# Patient Record
Sex: MALE | Race: WHITE | NOT HISPANIC OR LATINO
[De-identification: names, ages, dates, MRNs, and addresses within clinical notes are randomized per-mention and may not be internally consistent; named-entity substitution may affect disease eponyms.]

---

## 2020-12-31 DIAGNOSIS — Z82.49 FAMILY HISTORY OF ISCHEMIC HEART DISEASE AND OTHER DISEASES OF THE CIRCULATORY SYSTEM: ICD-10-CM

## 2020-12-31 DIAGNOSIS — Z87.891 PERSONAL HISTORY OF NICOTINE DEPENDENCE: ICD-10-CM

## 2020-12-31 PROBLEM — Z00.00 ENCOUNTER FOR PREVENTIVE HEALTH EXAMINATION: Status: ACTIVE | Noted: 2020-12-31

## 2020-12-31 RX ORDER — DAPAGLIFLOZIN 10 MG/1
10 TABLET, FILM COATED ORAL DAILY
Refills: 0 | Status: ACTIVE | COMMUNITY

## 2020-12-31 RX ORDER — SITAGLIPTIN AND METFORMIN HYDROCHLORIDE 50; 500 MG/1; MG/1
50-500 TABLET, FILM COATED ORAL TWICE DAILY
Refills: 0 | Status: ACTIVE | COMMUNITY

## 2020-12-31 RX ORDER — TAMSULOSIN HYDROCHLORIDE 0.4 MG/1
0.4 CAPSULE ORAL DAILY
Refills: 0 | Status: ACTIVE | COMMUNITY

## 2020-12-31 RX ORDER — DEXLANSOPRAZOLE 60 MG/1
60 CAPSULE, DELAYED RELEASE ORAL EVERY MORNING
Refills: 0 | Status: ACTIVE | COMMUNITY

## 2020-12-31 RX ORDER — ACETAMINOPHEN 325 MG/1
TABLET, FILM COATED ORAL
Refills: 0 | Status: ACTIVE | COMMUNITY

## 2020-12-31 RX ORDER — DULAGLUTIDE 1.5 MG/.5ML
1.5 INJECTION, SOLUTION SUBCUTANEOUS WEEKLY
Refills: 0 | Status: ACTIVE | COMMUNITY

## 2021-01-04 ENCOUNTER — APPOINTMENT (OUTPATIENT)
Dept: CARDIOLOGY | Facility: CLINIC | Age: 76
End: 2021-01-04
Payer: MEDICARE

## 2021-01-04 ENCOUNTER — NON-APPOINTMENT (OUTPATIENT)
Age: 76
End: 2021-01-04

## 2021-01-04 VITALS — SYSTOLIC BLOOD PRESSURE: 160 MMHG | DIASTOLIC BLOOD PRESSURE: 100 MMHG

## 2021-01-04 VITALS
BODY MASS INDEX: 34.86 KG/M2 | HEART RATE: 73 BPM | HEIGHT: 68 IN | DIASTOLIC BLOOD PRESSURE: 105 MMHG | OXYGEN SATURATION: 98 % | RESPIRATION RATE: 16 BRPM | TEMPERATURE: 96 F | WEIGHT: 230 LBS | SYSTOLIC BLOOD PRESSURE: 150 MMHG

## 2021-01-04 PROCEDURE — 93000 ELECTROCARDIOGRAM COMPLETE: CPT

## 2021-01-04 PROCEDURE — 99204 OFFICE O/P NEW MOD 45 MIN: CPT

## 2021-01-04 RX ORDER — VALSARTAN 160 MG/1
160 TABLET, COATED ORAL TWICE DAILY
Refills: 0 | Status: DISCONTINUED | COMMUNITY
End: 2021-01-04

## 2021-01-04 RX ORDER — ATENOLOL 25 MG/1
25 TABLET ORAL DAILY
Refills: 0 | Status: DISCONTINUED | COMMUNITY
End: 2021-01-04

## 2021-01-04 NOTE — REVIEW OF SYSTEMS
[Recent Weight Gain (___ Lbs)] : recent [unfilled] ~Ulb weight gain [Dyspnea on exertion] : dyspnea during exertion [Chest  Pressure] : chest pressure [Chest Pain] : chest pain [Negative] : Heme/Lymph [Lower Ext Edema] : no extremity edema [Palpitations] : no palpitations

## 2021-01-04 NOTE — HISTORY OF PRESENT ILLNESS
[FreeTextEntry1] : 76 yo man presents for evaluation of shortness of breath / chest pain. He presents with his son, Harris [phone]).\par \par H/o DM (on oral medications; no known complications of DM), HLD, HTN (long-standing), obesity; former smoker, stopped many years ago.\par \par No h/o heart disease, MI, heart failure, PVD, stroke. No bleeding issues. He had COVID in March, was ill, but not hospitalized. He did not require oxygen. He has gained weight since that time, and continues to feel short of breath with exertion since COVID. No SOB at rest. No PND, orthopnea. No LE edema. He states he had a recent CXR at PCP office, and was told was clear. \par \par He is compliant with his medications. He checks his BP at home, and the numbers remain elevated, consistently. He has noted this hypertension now for the past few months. For years, he has noted intermittent left sided chest pain, which he thinks gets worse with his BP elevation. This chest discomfort seems to be constant, lasting days, and seems to improve when his BP gets better. States he had a stress test a few years ago, which reportedly was negative. Says he takes a Russian medication under his tongue, which sometimes relieves his discomfort. He is unclear as to the name of this medication. \par \par Today's ECG\par Sinus rhythm \par First degree A-V block \par Left axis deviation\par Late transition\par Intraventricular conduction delay

## 2021-01-04 NOTE — PHYSICAL EXAM
[General Appearance - Well Developed] : well developed [Normal Appearance] : normal appearance [Well Groomed] : well groomed [General Appearance - Well Nourished] : well nourished [General Appearance - In No Acute Distress] : no acute distress [FreeTextEntry1] : no JVD, carotids 2+, no bruits [Heart Rate And Rhythm] : heart rate and rhythm were normal [Heart Sounds] : normal S1 and S2 [Murmurs] : no murmurs present [Arterial Pulses Normal] : the arterial pulses were normal [Edema] : no peripheral edema present [] : no respiratory distress [Auscultation Breath Sounds / Voice Sounds] : lungs were clear to auscultation bilaterally [Bowel Sounds] : normal bowel sounds [Abdomen Soft] : soft

## 2021-01-05 LAB
ALBUMIN SERPL ELPH-MCNC: 4.5 G/DL
ALP BLD-CCNC: 42 U/L
ALT SERPL-CCNC: 16 U/L
ANION GAP SERPL CALC-SCNC: 16 MMOL/L
AST SERPL-CCNC: 16 U/L
BASOPHILS # BLD AUTO: 0.04 K/UL
BASOPHILS NFR BLD AUTO: 0.5 %
BILIRUB SERPL-MCNC: 0.3 MG/DL
BUN SERPL-MCNC: 14 MG/DL
CALCIUM SERPL-MCNC: 9.7 MG/DL
CHLORIDE SERPL-SCNC: 98 MMOL/L
CHOLEST SERPL-MCNC: 247 MG/DL
CO2 SERPL-SCNC: 24 MMOL/L
CREAT SERPL-MCNC: 0.92 MG/DL
EOSINOPHIL # BLD AUTO: 0.26 K/UL
EOSINOPHIL NFR BLD AUTO: 2.9 %
ESTIMATED AVERAGE GLUCOSE: 146 MG/DL
GLUCOSE SERPL-MCNC: 68 MG/DL
HBA1C MFR BLD HPLC: 6.7 %
HCT VFR BLD CALC: 49.5 %
HDLC SERPL-MCNC: 62 MG/DL
HGB BLD-MCNC: 15.4 G/DL
IMM GRANULOCYTES NFR BLD AUTO: 0.6 %
LDLC SERPL CALC-MCNC: 147 MG/DL
LYMPHOCYTES # BLD AUTO: 2.72 K/UL
LYMPHOCYTES NFR BLD AUTO: 30.8 %
MAN DIFF?: NORMAL
MCHC RBC-ENTMCNC: 26.6 PG
MCHC RBC-ENTMCNC: 31.1 GM/DL
MCV RBC AUTO: 85.6 FL
MONOCYTES # BLD AUTO: 0.91 K/UL
MONOCYTES NFR BLD AUTO: 10.3 %
NEUTROPHILS # BLD AUTO: 4.86 K/UL
NEUTROPHILS NFR BLD AUTO: 54.9 %
NONHDLC SERPL-MCNC: 184 MG/DL
PLATELET # BLD AUTO: 292 K/UL
POTASSIUM SERPL-SCNC: 5.2 MMOL/L
PROT SERPL-MCNC: 7.3 G/DL
RBC # BLD: 5.78 M/UL
RBC # FLD: 12.9 %
SODIUM SERPL-SCNC: 139 MMOL/L
TRIGL SERPL-MCNC: 185 MG/DL
TSH SERPL-ACNC: 4.75 UIU/ML
WBC # FLD AUTO: 8.84 K/UL

## 2021-01-07 ENCOUNTER — APPOINTMENT (OUTPATIENT)
Dept: CV DIAGNOSTICS | Facility: HOSPITAL | Age: 76
End: 2021-01-07
Payer: MEDICARE

## 2021-01-07 ENCOUNTER — OUTPATIENT (OUTPATIENT)
Dept: OUTPATIENT SERVICES | Facility: HOSPITAL | Age: 76
LOS: 1 days | End: 2021-01-07

## 2021-01-07 DIAGNOSIS — I10 ESSENTIAL (PRIMARY) HYPERTENSION: ICD-10-CM

## 2021-01-07 PROCEDURE — 78452 HT MUSCLE IMAGE SPECT MULT: CPT | Mod: 26

## 2021-01-07 PROCEDURE — 93016 CV STRESS TEST SUPVJ ONLY: CPT | Mod: GC

## 2021-01-07 PROCEDURE — 93018 CV STRESS TEST I&R ONLY: CPT | Mod: GC

## 2021-01-11 ENCOUNTER — OUTPATIENT (OUTPATIENT)
Dept: OUTPATIENT SERVICES | Facility: HOSPITAL | Age: 76
LOS: 1 days | End: 2021-01-11

## 2021-01-11 ENCOUNTER — APPOINTMENT (OUTPATIENT)
Dept: CV DIAGNOSITCS | Facility: HOSPITAL | Age: 76
End: 2021-01-11
Payer: MEDICARE

## 2021-01-11 DIAGNOSIS — I10 ESSENTIAL (PRIMARY) HYPERTENSION: ICD-10-CM

## 2021-01-11 PROCEDURE — 93306 TTE W/DOPPLER COMPLETE: CPT | Mod: 26

## 2021-01-25 ENCOUNTER — APPOINTMENT (OUTPATIENT)
Dept: CARDIOLOGY | Facility: CLINIC | Age: 76
End: 2021-01-25
Payer: MEDICARE

## 2021-01-25 VITALS — HEART RATE: 80 BPM | RESPIRATION RATE: 18 BRPM | DIASTOLIC BLOOD PRESSURE: 88 MMHG | SYSTOLIC BLOOD PRESSURE: 125 MMHG

## 2021-01-25 PROCEDURE — 99214 OFFICE O/P EST MOD 30 MIN: CPT

## 2021-01-25 NOTE — HISTORY OF PRESENT ILLNESS
[FreeTextEntry1] : 76 yo man presents for followup of shortness of breath / chest pain. He presents with his son, Harris (tel: [phone]).\par \par H/o DM (on oral medications; no known complications of DM), HLD, HTN (long-standing), obesity; former smoker, stopped many years ago.\par \par No h/o known heart disease, MI, heart failure, PVD, stroke. No bleeding issues. He had COVID in March, was ill, but not hospitalized. He did not require oxygen. He has gained weight since that time, and continues to feel short of breath with exertion since COVID. No SOB at rest. No PND, orthopnea. No LE edema. He states he had a recent CXR at PCP office, and was told was clear. He is compliant with his medications. He checks his BP at home.\par \par At his last visit, the following issues were discussed:\par Diabetes (250.00) (E11.9)\par  · Continue with med rx and diet to address his DM. He understands the need for\par     improvement in his diet with an eye towards weight loss.\par Hypertension (401.9) (I10)\par  · Persistent, poorly controlled hypertension. Will adjust medications today and follow\par     closely. Today, will change atenolol to carvedilol and valsartan to losartan/HCTZ.\par Shortness of breath (786.05) (R06.02)\par  · Chronic CAMACHO, perhaps due to COVID, obesity, heart disease.\par Hyperlipemia (272.4) (E78.5)\par  · Continue statin therapy.\par Chest pain (786.50) (R07.9)\par  · Long-standning, near constant, atypical-sounding chest discomfort. However, he is\par     clearly at risk for CAD. Will pursue echo and a stress\par     test, as well as labs.\par \par Recent data:\par Echo: minimal MR, mild aortic root dilation, calcified aortic valve with normal opening, normal LA, grossly normal LVEF, no peric effusion\par Stress test: Regadenoson; no chest pain or EKG changes; scan shows a small, mild defect in the mid to distal inferolateral wall (fixed) c/w infarct; LVEF 60% with no segmental wall motion abnormality\par CXR ([facility]) in Oct 2020: no pneumonia; bibasilar atelectasis/scarring; small hiatal hernia\par Labs: CBC normal\par A1c 6.7%\par TSH 4.75\par chol 247, trig 185, HDL 62,  (Crestor dose was increased to 20 mg)\par Na 139, K 5.2, BUN 14, creat 0.92, LFT's normal\par \par Today, he presents with his son (on the phone). BP has been relatively stable at home. He has been compliant with all of the medications. Breathing seems a bit better (in general), including with exertion. Rare, fleeting under left breast discomfort, better with message. Overall, he is pleased with his progress.

## 2021-01-25 NOTE — REVIEW OF SYSTEMS
[Recent Weight Gain (___ Lbs)] : recent [unfilled] ~Ulb weight gain [Dyspnea on exertion] : dyspnea during exertion [Chest Pain] : chest pain [Negative] : Heme/Lymph [Chest  Pressure] : no chest pressure [Lower Ext Edema] : no extremity edema [Palpitations] : no palpitations

## 2021-01-25 NOTE — PHYSICAL EXAM
[General Appearance - Well Developed] : well developed [Normal Appearance] : normal appearance [Well Groomed] : well groomed [General Appearance - Well Nourished] : well nourished [General Appearance - In No Acute Distress] : no acute distress [Auscultation Breath Sounds / Voice Sounds] : lungs were clear to auscultation bilaterally [] : no respiratory distress [Heart Rate And Rhythm] : heart rate and rhythm were normal [Heart Sounds] : normal S1 and S2 [Murmurs] : no murmurs present [Arterial Pulses Normal] : the arterial pulses were normal [Edema] : no peripheral edema present [Bowel Sounds] : normal bowel sounds [Abdomen Soft] : soft [FreeTextEntry1] : no JVD, carotids 2+, no bruits

## 2021-01-26 LAB
ANION GAP SERPL CALC-SCNC: 26 MMOL/L
BUN SERPL-MCNC: 16 MG/DL
CALCIUM SERPL-MCNC: 9.9 MG/DL
CHLORIDE SERPL-SCNC: 98 MMOL/L
CO2 SERPL-SCNC: 13 MMOL/L
CREAT SERPL-MCNC: 0.88 MG/DL
GLUCOSE SERPL-MCNC: 96 MG/DL
POTASSIUM SERPL-SCNC: 5.1 MMOL/L
SODIUM SERPL-SCNC: 138 MMOL/L

## 2021-05-03 ENCOUNTER — NON-APPOINTMENT (OUTPATIENT)
Age: 76
End: 2021-05-03

## 2021-05-03 ENCOUNTER — APPOINTMENT (OUTPATIENT)
Dept: CARDIOLOGY | Facility: CLINIC | Age: 76
End: 2021-05-03
Payer: MEDICARE

## 2021-05-03 VITALS
HEART RATE: 68 BPM | TEMPERATURE: 96.9 F | OXYGEN SATURATION: 98 % | BODY MASS INDEX: 35.92 KG/M2 | DIASTOLIC BLOOD PRESSURE: 100 MMHG | WEIGHT: 237 LBS | HEIGHT: 68 IN | SYSTOLIC BLOOD PRESSURE: 162 MMHG

## 2021-05-03 PROCEDURE — 99214 OFFICE O/P EST MOD 30 MIN: CPT

## 2021-05-03 PROCEDURE — 93000 ELECTROCARDIOGRAM COMPLETE: CPT

## 2021-05-03 NOTE — PHYSICAL EXAM
[Well Developed] : well developed [Well Nourished] : well nourished [No Acute Distress] : no acute distress [Obese] : obese [Normal Conjunctiva] : normal conjunctiva [Normal Venous Pressure] : normal venous pressure [No Carotid Bruit] : no carotid bruit [Normal S1, S2] : normal S1, S2 [No Murmur] : no murmur [No Rub] : no rub [No Gallop] : no gallop [Clear Lung Fields] : clear lung fields [Good Air Entry] : good air entry [No Respiratory Distress] : no respiratory distress  [Soft] : abdomen soft [Non Tender] : non-tender [No Masses/organomegaly] : no masses/organomegaly [Normal Bowel Sounds] : normal bowel sounds [Normal Gait] : normal gait [No Cyanosis] : no cyanosis [No Clubbing] : no clubbing [No Varicosities] : no varicosities [No Rash] : no rash [No Skin Lesions] : no skin lesions [Moves all extremities] : moves all extremities [No Focal Deficits] : no focal deficits [Normal Speech] : normal speech [Alert and Oriented] : alert and oriented [Normal memory] : normal memory [de-identified] : Mild pitting edema, ankles

## 2021-05-03 NOTE — REASON FOR VISIT
[FreeTextEntry1] : 77 yo man presents for followup of shortness of breath / chest pain. He presents with his son, Harris (tel: [phone]).\par \par H/o DM (on oral medications; no known complications of DM), HLD, HTN (long-standing), obesity; former smoker, stopped many years ago.\par \par No h/o known heart disease, MI, heart failure, PVD, stroke. No bleeding issues. He had COVID in March, was ill, but not hospitalized. He did not require oxygen. He has gained weight since that time, and continues to feel short of breath with exertion since COVID. No SOB at rest. No PND, orthopnea. No LE edema. He states he had a recent CXR at PCP office, and was told was clear. He is compliant with his medications. He checks his BP at home.\par \par At his last visit, the following issues were discussed:\par Chest pain (786.50) (R07.9)\par  · Long-standning, rarely occuring, fleeting, atypical-sounding chest discomfort. Stress test\par     shows only a small inferior fixed defect. No indication for\par     further CV testing (i.e. cardiac cath). For now, just continue to treat with\par     medications, lifestyle (weight).\par Diabetes (250.00) (E11.9)\par  · Continue with med rx and diet to address his DM. He understands the need for\par     improvement in his diet with an eye towards weight loss.\par Hyperlipemia (272.4) (E78.5)\par  · Continue statin therapy, now at 20 mg dose.\par Hypertension (401.9) (I10)\par  · BP better at home and in office on new regimen. No changes to regimen. Will check labs\par     today given recent addition of losartan/HCTZ.\par Shortness of breath (786.05) (R06.02)\par  · Chronic CAMACHO, perhaps due to COVID, obesity. Recent echo show normal valve and\par     cardiac function. Recent CXR also unremarkable. No further testing at this time.\par \par Recent data:\par Echo: minimal MR, mild aortic root dilation, calcified aortic valve with normal opening, normal LA, grossly normal LVEF, no peric effusion\par Stress test: Regadenoson; no chest pain or EKG changes; scan shows a small, mild defect in the mid to distal inferolateral wall (fixed) c/w infarct; LVEF 60% with no segmental wall motion abnormality\par CXR ([facility]) in Oct 2020: no pneumonia; bibasilar atelectasis/scarring; small hiatal hernia\par Labs Jan 2021: \par Na 138, K 5.1, BUN 16, creat 0.88\par CBC normal\par A1c 6.7%\par TSH 4.75\par chol 247, trig 185, HDL 62,  (Crestor dose was increased to 20 mg)\par \par Today, reports that he is doing well. No significant CAMACHO/SOB; no chest pain; no syncope. Occ missed dose. Not eating low salt. Mild LE edema. \par \par ECG today:\par Sinus rhythm  \par First degree A-V block \par Left anterior fascicular block\par Intraventricular conduction delay\par Late transition

## 2021-05-04 LAB
ALBUMIN SERPL ELPH-MCNC: 4.1 G/DL
ALP BLD-CCNC: 42 U/L
ALT SERPL-CCNC: 15 U/L
ANION GAP SERPL CALC-SCNC: 13 MMOL/L
AST SERPL-CCNC: 17 U/L
BILIRUB SERPL-MCNC: 0.3 MG/DL
BUN SERPL-MCNC: 14 MG/DL
CALCIUM SERPL-MCNC: 9.3 MG/DL
CHLORIDE SERPL-SCNC: 101 MMOL/L
CHOLEST SERPL-MCNC: 146 MG/DL
CO2 SERPL-SCNC: 24 MMOL/L
CREAT SERPL-MCNC: 0.92 MG/DL
ESTIMATED AVERAGE GLUCOSE: 148 MG/DL
GLUCOSE SERPL-MCNC: 74 MG/DL
HBA1C MFR BLD HPLC: 6.8 %
HDLC SERPL-MCNC: 57 MG/DL
LDLC SERPL CALC-MCNC: 64 MG/DL
NONHDLC SERPL-MCNC: 89 MG/DL
POTASSIUM SERPL-SCNC: 5.2 MMOL/L
PROT SERPL-MCNC: 6.6 G/DL
SODIUM SERPL-SCNC: 138 MMOL/L
TRIGL SERPL-MCNC: 127 MG/DL

## 2021-06-14 ENCOUNTER — APPOINTMENT (OUTPATIENT)
Dept: CARDIOLOGY | Facility: CLINIC | Age: 76
End: 2021-06-14
Payer: MEDICARE

## 2021-06-14 ENCOUNTER — NON-APPOINTMENT (OUTPATIENT)
Age: 76
End: 2021-06-14

## 2021-06-14 VITALS
SYSTOLIC BLOOD PRESSURE: 137 MMHG | HEART RATE: 70 BPM | HEIGHT: 68 IN | OXYGEN SATURATION: 96 % | WEIGHT: 230 LBS | TEMPERATURE: 96.7 F | BODY MASS INDEX: 34.86 KG/M2 | DIASTOLIC BLOOD PRESSURE: 95 MMHG

## 2021-06-14 PROCEDURE — 99214 OFFICE O/P EST MOD 30 MIN: CPT

## 2021-06-14 PROCEDURE — 93000 ELECTROCARDIOGRAM COMPLETE: CPT

## 2021-06-14 NOTE — PHYSICAL EXAM
[Well Developed] : well developed [Well Nourished] : well nourished [No Acute Distress] : no acute distress [Normal Conjunctiva] : normal conjunctiva [Normal Venous Pressure] : normal venous pressure [No Carotid Bruit] : no carotid bruit [Normal S1, S2] : normal S1, S2 [No Murmur] : no murmur [No Rub] : no rub [No Gallop] : no gallop [Clear Lung Fields] : clear lung fields [Good Air Entry] : good air entry [No Respiratory Distress] : no respiratory distress  [Soft] : abdomen soft [Non Tender] : non-tender [No Masses/organomegaly] : no masses/organomegaly [Normal Bowel Sounds] : normal bowel sounds [Normal Gait] : normal gait [No Edema] : no edema [No Cyanosis] : no cyanosis [No Clubbing] : no clubbing [No Varicosities] : no varicosities [No Rash] : no rash [No Skin Lesions] : no skin lesions [Moves all extremities] : moves all extremities [No Focal Deficits] : no focal deficits [Normal Speech] : normal speech [Alert and Oriented] : alert and oriented [Normal memory] : normal memory [de-identified] : Repeat /90 mmHg [de-identified] : overweight

## 2021-06-14 NOTE — HISTORY OF PRESENT ILLNESS
[FreeTextEntry1] : 75 yo man presents for followup of shortness of breath / chest pain. He presents with his son, Harris (tel: [phone]).\par \par H/o DM (on oral medications; no known complications of DM), HLD, HTN (long-standing), obesity; former smoker, stopped many years ago. No h/o known heart disease, MI, heart failure, PVD, stroke. No bleeding issues. He had COVID in March, was ill, but not hospitalized.\par \par At his last visit in early May, the following issues were discussed:\par Chest pain (786.50) (R07.9)\par  · No chest discomfort. Prior stress test shows only a small inferior fixed defect. No\par     indication for further CV testing (i.e. cardiac cath). For now, continue to treat risk factors\par     with medications, lifestyle (weight).\par Diabetes (250.00) (E11.9)\par  · Continue with med rx and diet to address his DM. He understands the need for\par     improvement in his diet with an eye towards weight loss.\par Hyperlipemia (272.4) (E78.5)\par  · Continue statin therapy, now at 20 mg dose. Will check lipid levels.\par Hypertension (401.9) (I10)\par  · BP still not optimally controlled. At home, typically 140/90 mmHg, like in office today. Will\par     check labs today. I have asked him to increase the frequency of BP checks at home; will\par     see him again in one month, and have asked him to bring his pill bottles with him to\par     clinic.\par Shortness of breath (786.05) (R06.02)\par  · Chronic CAMACHO, improved, but does not do much physical activity. Recent echo show\par     normal valve and cardiac function. Recent CXR also unremarkable. No further testing at\par     this time.\par \par CV data:\par Echo: minimal MR, mild aortic root dilation, calcified aortic valve with normal opening, normal LA, grossly normal LVEF, no peric effusion\par Stress test: Regadenoson; no chest pain or EKG changes; scan shows a small, mild defect in the mid to distal inferolateral wall (fixed) c/w infarct; LVEF 60% with no segmental wall motion abnormality\par CXR ([facility]) in Oct 2020: no pneumonia; bibasilar atelectasis/scarring; small hiatal hernia\par Labs May 2021: CBC normal\par A1c 6.8%\par TSH 4.75\par chol 146, trig 127, HDL 57, LDL 64 \par Na 138, K 5.2, BUN 14, creat 0.92, LFT's normal\par \par Today, states he is feeling well. No HF or anginal symptoms. Taking his medications daily. He has not been checking his BP b/c he was "feeling well". \par \par ECG today:\par Sinus rhythm  \par First degree A-V block \par Left anterior fascicular block. \par ABNORMAL ECG

## 2021-06-17 ENCOUNTER — RX RENEWAL (OUTPATIENT)
Age: 76
End: 2021-06-17

## 2021-06-18 RX ORDER — AMLODIPINE BESYLATE 2.5 MG/1
2.5 TABLET ORAL DAILY
Qty: 90 | Refills: 3 | Status: DISCONTINUED | COMMUNITY
Start: 2021-06-14 | End: 2021-06-18

## 2021-07-26 ENCOUNTER — NON-APPOINTMENT (OUTPATIENT)
Age: 76
End: 2021-07-26

## 2021-07-26 ENCOUNTER — APPOINTMENT (OUTPATIENT)
Dept: CARDIOLOGY | Facility: CLINIC | Age: 76
End: 2021-07-26
Payer: MEDICARE

## 2021-07-26 VITALS
DIASTOLIC BLOOD PRESSURE: 105 MMHG | RESPIRATION RATE: 18 BRPM | WEIGHT: 230 LBS | BODY MASS INDEX: 34.86 KG/M2 | HEART RATE: 76 BPM | OXYGEN SATURATION: 96 % | SYSTOLIC BLOOD PRESSURE: 153 MMHG | TEMPERATURE: 94.6 F | HEIGHT: 68 IN

## 2021-07-26 PROCEDURE — 99213 OFFICE O/P EST LOW 20 MIN: CPT

## 2021-07-26 PROCEDURE — 93000 ELECTROCARDIOGRAM COMPLETE: CPT

## 2021-07-26 RX ORDER — CARVEDILOL 6.25 MG/1
6.25 TABLET, FILM COATED ORAL
Qty: 180 | Refills: 3 | Status: ACTIVE | COMMUNITY
Start: 2021-01-04 | End: 1900-01-01

## 2021-07-26 NOTE — HISTORY OF PRESENT ILLNESS
[FreeTextEntry1] : 75 yo man presents for followup of shortness of breath / chest pain. He son is Harris (tel: [phone]).\par \par H/o DM (on oral medications; no known complications of DM), HLD, HTN (long-standing), obesity; former smoker, stopped many years ago. No h/o known heart disease, MI, heart failure, PVD, stroke. No bleeding issues. He had COVID in March, was ill, but not hospitalized.\par \par At his last visit in June, the following issues were discussed:\par Chest pain (786.50) (R07.9)\par  · No chest discomfort. Prior stress test shows a small inferior fixed defect. No indication\par     for further CV testing (i.e. cardiac cath). For now, continue to treat risk factors with\par     medications, lifestyle (weight).\par Diabetes (250.00) (E11.9)\par  · Continue with med rx and diet to address his DM. He understands the need for\par     improvement in his diet with an eye towards weight loss. Last A1c <7%.\par Hyperlipemia (272.4) (E78.5)\par  · Continue statin therapy, now at 20 mg dose. Excellent recent results.\par Hypertension (401.9) (I10)\par  · BP still not optimally controlled. He is not reliably checking BP at home. I have asked him\par     to increase the frequency of BP checks at home. For now, have added low-dose\par     amlodipine (2.5 mg daily). Goal BP <130/85 mmHg. Last labs were stable.\par Shortness of breath (786.05) (R06.02)\par  · Chronic CAMACHO, improved, but does not do much physical activity. Recent echo show\par     normal valve and cardiac function. Recent CXR also unremarkable. No further testing at\par     this time.\par \par CV data:\par Echo: minimal MR, mild aortic root dilation, calcified aortic valve with normal opening, normal LA, grossly normal LVEF, no peric effusion\par Stress test: Regadenoson; no chest pain or EKG changes; scan shows a small, mild defect in the mid to distal inferolateral wall (fixed) c/w infarct; LVEF 60% with no segmental wall motion abnormality\par CXR ([facility]) in Oct 2020: no pneumonia; bibasilar atelectasis/scarring; small hiatal hernia\par Labs May 2021: CBC normal\par A1c 6.8%\par TSH 4.75\par chol 146, trig 127, HDL 57, LDL 64 \par Na 138, K 5.2, BUN 14, creat 0.92, LFT's normal\par \par ECG today:\par Sinus rhythm  \par First degree A-V block \par Left anterior fascicular block\par Late transition\par \par Today, Mr. Quiroga presents for f/u. Reports home BP b/n 120-150 mmHg. The amlodipine started at last visit was stopped due to symptomatic hypotension. He is also taking only one half of his losartan/HCTZ pill. No HF or anginal symptoms. Taking his medications daily.\par

## 2021-07-26 NOTE — PHYSICAL EXAM
[Well Developed] : well developed [Well Nourished] : well nourished [No Acute Distress] : no acute distress [Normal Conjunctiva] : normal conjunctiva [Normal Venous Pressure] : normal venous pressure [No Carotid Bruit] : no carotid bruit [Normal S1, S2] : normal S1, S2 [No Murmur] : no murmur [No Rub] : no rub [No Gallop] : no gallop [Clear Lung Fields] : clear lung fields [Good Air Entry] : good air entry [No Respiratory Distress] : no respiratory distress  [Soft] : abdomen soft [Non Tender] : non-tender [No Masses/organomegaly] : no masses/organomegaly [Normal Bowel Sounds] : normal bowel sounds [Normal Gait] : normal gait [No Edema] : no edema [No Cyanosis] : no cyanosis [No Clubbing] : no clubbing [No Varicosities] : no varicosities [No Rash] : no rash [No Skin Lesions] : no skin lesions [Moves all extremities] : moves all extremities [No Focal Deficits] : no focal deficits [Normal Speech] : normal speech [Alert and Oriented] : alert and oriented [Normal memory] : normal memory [de-identified] : Repeat /96 mmHg [de-identified] : overweight

## 2021-09-02 ENCOUNTER — RX RENEWAL (OUTPATIENT)
Age: 76
End: 2021-09-02

## 2021-09-02 RX ORDER — ROSUVASTATIN CALCIUM 20 MG/1
20 TABLET, FILM COATED ORAL DAILY
Qty: 90 | Refills: 3 | Status: ACTIVE | COMMUNITY
Start: 2021-09-02 | End: 1900-01-01

## 2021-09-14 ENCOUNTER — TRANSCRIPTION ENCOUNTER (OUTPATIENT)
Age: 76
End: 2021-09-14

## 2021-10-06 NOTE — HISTORY OF PRESENT ILLNESS
[FreeTextEntry1] : 77 yo man presents for followup of shortness of breath / chest pain. He son is Harris (tel: [phone]).\par \par H/o DM (on oral medications; no known complications of DM), HLD, HTN (long-standing), obesity; former smoker, stopped many years ago. No h/o known heart disease, MI, heart failure, PVD, stroke. No bleeding issues. He had COVID.\par \par CV data:\par Echo: minimal MR, mild aortic root dilation, calcified aortic valve with normal opening, normal LA, grossly normal LVEF, no peric effusion\par Stress test: Regadenoson; no chest pain or EKG changes; scan shows a small, mild defect in the mid to distal inferolateral wall (fixed) c/w infarct; LVEF 60% with no segmental wall motion abnormality\par CXR ([facility]) in Oct 2020: no pneumonia; bibasilar atelectasis/scarring; small hiatal hernia\par Labs May 2021: CBC normal\par A1c 6.8%\par TSH 4.75\par chol 146, trig 127, HDL 57, LDL 64 \par Na 138, K 5.2, BUN 14, creat 0.92, LFT's normal\par \par At his last visit in July 2021, the following issues were discussed:\par Chest pain (786.50) (R07.9)\par  · No chest discomfort. Prior stress test shows a small inferior fixed defect. No indication\par     for further CV testing (i.e. cardiac cath). For now, continue to treat risk factors with\par     medications, lifestyle (weight).\par Diabetes (250.00) (E11.9)\par  · Continue with med rx and diet to address his DM. He understands the need for\par     improvement in his diet with an eye towards weight loss.\par Hyperlipemia (272.4) (E78.5)\par  · Continue statin therapy. Excellent recent results.\par Hypertension (401.9) (I10)\par  · BP still not optimally controlled. I have asked him to increase the dose of his ARB/HCTZ\par     back to a full fill per day (100/25 mg daily). Goal BP <130/85 mmHg. Last labs were\par     stable.\par Shortness of breath (786.05) (R06.02)\par  · Chronic CAMACHO, improved over past few months; stable. Echo show normal valve and\par     cardiac function. Recent CXR also unremarkable. No further testing at this time.\par \par Today, Mr. Quiroga presents for f/u.\par

## 2021-10-11 ENCOUNTER — APPOINTMENT (OUTPATIENT)
Dept: CARDIOLOGY | Facility: CLINIC | Age: 76
End: 2021-10-11

## 2022-02-21 ENCOUNTER — RX RENEWAL (OUTPATIENT)
Age: 77
End: 2022-02-21

## 2022-10-03 ENCOUNTER — INPATIENT (INPATIENT)
Facility: HOSPITAL | Age: 77
LOS: 0 days | Discharge: HOME | End: 2022-10-04
Attending: HOSPITALIST | Admitting: HOSPITALIST

## 2022-10-03 ENCOUNTER — APPOINTMENT (OUTPATIENT)
Dept: CARDIOLOGY | Facility: CLINIC | Age: 77
End: 2022-10-03

## 2022-10-03 ENCOUNTER — NON-APPOINTMENT (OUTPATIENT)
Age: 77
End: 2022-10-03

## 2022-10-03 VITALS
HEART RATE: 74 BPM | SYSTOLIC BLOOD PRESSURE: 121 MMHG | DIASTOLIC BLOOD PRESSURE: 74 MMHG | TEMPERATURE: 98 F | OXYGEN SATURATION: 97 % | RESPIRATION RATE: 16 BRPM

## 2022-10-03 VITALS
DIASTOLIC BLOOD PRESSURE: 85 MMHG | OXYGEN SATURATION: 97 % | SYSTOLIC BLOOD PRESSURE: 139 MMHG | BODY MASS INDEX: 35.12 KG/M2 | HEIGHT: 68 IN | HEART RATE: 71 BPM | TEMPERATURE: 97.8 F

## 2022-10-03 VITALS — WEIGHT: 231 LBS | BODY MASS INDEX: 35.12 KG/M2

## 2022-10-03 DIAGNOSIS — R07.9 CHEST PAIN, UNSPECIFIED: ICD-10-CM

## 2022-10-03 LAB
ALBUMIN SERPL ELPH-MCNC: 4.3 G/DL — SIGNIFICANT CHANGE UP (ref 3.3–5)
ALP SERPL-CCNC: 42 U/L — SIGNIFICANT CHANGE UP (ref 40–120)
ALT FLD-CCNC: 19 U/L — SIGNIFICANT CHANGE UP (ref 4–41)
ANION GAP SERPL CALC-SCNC: 13 MMOL/L — SIGNIFICANT CHANGE UP (ref 7–14)
APTT BLD: 74.3 SEC — HIGH (ref 27–36.3)
AST SERPL-CCNC: 19 U/L — SIGNIFICANT CHANGE UP (ref 4–40)
BASOPHILS # BLD AUTO: 0.06 K/UL — SIGNIFICANT CHANGE UP (ref 0–0.2)
BASOPHILS NFR BLD AUTO: 0.5 % — SIGNIFICANT CHANGE UP (ref 0–2)
BILIRUB SERPL-MCNC: 0.4 MG/DL — SIGNIFICANT CHANGE UP (ref 0.2–1.2)
BLD GP AB SCN SERPL QL: NEGATIVE — SIGNIFICANT CHANGE UP
BUN SERPL-MCNC: 14 MG/DL — SIGNIFICANT CHANGE UP (ref 7–23)
CALCIUM SERPL-MCNC: 9.5 MG/DL — SIGNIFICANT CHANGE UP (ref 8.4–10.5)
CHLORIDE SERPL-SCNC: 99 MMOL/L — SIGNIFICANT CHANGE UP (ref 98–107)
CO2 SERPL-SCNC: 25 MMOL/L — SIGNIFICANT CHANGE UP (ref 22–31)
CREAT SERPL-MCNC: 0.85 MG/DL — SIGNIFICANT CHANGE UP (ref 0.5–1.3)
D DIMER BLD IA.RAPID-MCNC: 343 NG/ML DDU — HIGH
EGFR: 91 ML/MIN/1.73M2 — SIGNIFICANT CHANGE UP
EOSINOPHIL # BLD AUTO: 0.37 K/UL — SIGNIFICANT CHANGE UP (ref 0–0.5)
EOSINOPHIL NFR BLD AUTO: 2.9 % — SIGNIFICANT CHANGE UP (ref 0–6)
FLUAV AG NPH QL: SIGNIFICANT CHANGE UP
FLUBV AG NPH QL: SIGNIFICANT CHANGE UP
GLUCOSE SERPL-MCNC: 106 MG/DL — HIGH (ref 70–99)
HCT VFR BLD CALC: 48.1 % — SIGNIFICANT CHANGE UP (ref 39–50)
HGB BLD-MCNC: 15 G/DL — SIGNIFICANT CHANGE UP (ref 13–17)
IANC: 8.01 K/UL — HIGH (ref 1.8–7.4)
IMM GRANULOCYTES NFR BLD AUTO: 0.4 % — SIGNIFICANT CHANGE UP (ref 0–0.9)
INR BLD: 0.99 RATIO — SIGNIFICANT CHANGE UP (ref 0.88–1.16)
LIDOCAIN IGE QN: 15 U/L — SIGNIFICANT CHANGE UP (ref 7–60)
LYMPHOCYTES # BLD AUTO: 2.96 K/UL — SIGNIFICANT CHANGE UP (ref 1–3.3)
LYMPHOCYTES # BLD AUTO: 23.6 % — SIGNIFICANT CHANGE UP (ref 13–44)
MAGNESIUM SERPL-MCNC: 1.5 MG/DL — LOW (ref 1.6–2.6)
MCHC RBC-ENTMCNC: 26.5 PG — LOW (ref 27–34)
MCHC RBC-ENTMCNC: 31.2 GM/DL — LOW (ref 32–36)
MCV RBC AUTO: 85.1 FL — SIGNIFICANT CHANGE UP (ref 80–100)
MONOCYTES # BLD AUTO: 1.11 K/UL — HIGH (ref 0–0.9)
MONOCYTES NFR BLD AUTO: 8.8 % — SIGNIFICANT CHANGE UP (ref 2–14)
NEUTROPHILS # BLD AUTO: 8.01 K/UL — HIGH (ref 1.8–7.4)
NEUTROPHILS NFR BLD AUTO: 63.8 % — SIGNIFICANT CHANGE UP (ref 43–77)
NRBC # BLD: 0 /100 WBCS — SIGNIFICANT CHANGE UP (ref 0–0)
NRBC # FLD: 0 K/UL — SIGNIFICANT CHANGE UP (ref 0–0)
NT-PROBNP SERPL-SCNC: 45 PG/ML — SIGNIFICANT CHANGE UP
PLATELET # BLD AUTO: 307 K/UL — SIGNIFICANT CHANGE UP (ref 150–400)
POTASSIUM SERPL-MCNC: 4.3 MMOL/L — SIGNIFICANT CHANGE UP (ref 3.5–5.3)
POTASSIUM SERPL-SCNC: 4.3 MMOL/L — SIGNIFICANT CHANGE UP (ref 3.5–5.3)
PROT SERPL-MCNC: 6.9 G/DL — SIGNIFICANT CHANGE UP (ref 6–8.3)
PROTHROM AB SERPL-ACNC: 11.5 SEC — SIGNIFICANT CHANGE UP (ref 10.5–13.4)
RBC # BLD: 5.65 M/UL — SIGNIFICANT CHANGE UP (ref 4.2–5.8)
RBC # FLD: 13 % — SIGNIFICANT CHANGE UP (ref 10.3–14.5)
RH IG SCN BLD-IMP: POSITIVE — SIGNIFICANT CHANGE UP
RSV RNA NPH QL NAA+NON-PROBE: SIGNIFICANT CHANGE UP
SARS-COV-2 RNA SPEC QL NAA+PROBE: SIGNIFICANT CHANGE UP
SODIUM SERPL-SCNC: 137 MMOL/L — SIGNIFICANT CHANGE UP (ref 135–145)
TROPONIN T, HIGH SENSITIVITY RESULT: 12 NG/L — SIGNIFICANT CHANGE UP
TROPONIN T, HIGH SENSITIVITY RESULT: 13 NG/L — SIGNIFICANT CHANGE UP
WBC # BLD: 12.56 K/UL — HIGH (ref 3.8–10.5)
WBC # FLD AUTO: 12.56 K/UL — HIGH (ref 3.8–10.5)

## 2022-10-03 PROCEDURE — 99223 1ST HOSP IP/OBS HIGH 75: CPT

## 2022-10-03 PROCEDURE — 71046 X-RAY EXAM CHEST 2 VIEWS: CPT | Mod: 26

## 2022-10-03 PROCEDURE — 99222 1ST HOSP IP/OBS MODERATE 55: CPT

## 2022-10-03 PROCEDURE — 93010 ELECTROCARDIOGRAM REPORT: CPT

## 2022-10-03 PROCEDURE — 99285 EMERGENCY DEPT VISIT HI MDM: CPT

## 2022-10-03 PROCEDURE — 93000 ELECTROCARDIOGRAM COMPLETE: CPT | Mod: PD

## 2022-10-03 RX ORDER — HEPARIN SODIUM 5000 [USP'U]/ML
5000 INJECTION INTRAVENOUS; SUBCUTANEOUS ONCE
Refills: 0 | Status: COMPLETED | OUTPATIENT
Start: 2022-10-03 | End: 2022-10-03

## 2022-10-03 RX ORDER — CLOPIDOGREL BISULFATE 75 MG/1
600 TABLET, FILM COATED ORAL ONCE
Refills: 0 | Status: COMPLETED | OUTPATIENT
Start: 2022-10-03 | End: 2022-10-03

## 2022-10-03 RX ORDER — HEPARIN SODIUM 5000 [USP'U]/ML
6000 INJECTION INTRAVENOUS; SUBCUTANEOUS EVERY 6 HOURS
Refills: 0 | Status: DISCONTINUED | OUTPATIENT
Start: 2022-10-03 | End: 2022-10-04

## 2022-10-03 RX ORDER — CLOPIDOGREL BISULFATE 75 MG/1
300 TABLET, FILM COATED ORAL ONCE
Refills: 0 | Status: DISCONTINUED | OUTPATIENT
Start: 2022-10-03 | End: 2022-10-03

## 2022-10-03 RX ORDER — ASPIRIN/CALCIUM CARB/MAGNESIUM 324 MG
324 TABLET ORAL ONCE
Refills: 0 | Status: COMPLETED | OUTPATIENT
Start: 2022-10-03 | End: 2022-10-03

## 2022-10-03 RX ORDER — MAGNESIUM SULFATE 500 MG/ML
2 VIAL (ML) INJECTION ONCE
Refills: 0 | Status: COMPLETED | OUTPATIENT
Start: 2022-10-03 | End: 2022-10-03

## 2022-10-03 RX ORDER — HEPARIN SODIUM 5000 [USP'U]/ML
INJECTION INTRAVENOUS; SUBCUTANEOUS
Qty: 25000 | Refills: 0 | Status: DISCONTINUED | OUTPATIENT
Start: 2022-10-03 | End: 2022-10-04

## 2022-10-03 RX ADMIN — HEPARIN SODIUM 5000 UNIT(S): 5000 INJECTION INTRAVENOUS; SUBCUTANEOUS at 20:00

## 2022-10-03 RX ADMIN — HEPARIN SODIUM 1000 UNIT(S)/HR: 5000 INJECTION INTRAVENOUS; SUBCUTANEOUS at 20:07

## 2022-10-03 RX ADMIN — Medication 324 MILLIGRAM(S): at 20:00

## 2022-10-03 RX ADMIN — Medication 25 GRAM(S): at 19:59

## 2022-10-03 RX ADMIN — CLOPIDOGREL BISULFATE 600 MILLIGRAM(S): 75 TABLET, FILM COATED ORAL at 19:59

## 2022-10-03 NOTE — ED PROVIDER NOTE - PHYSICAL EXAMINATION
CONSTITUTIONAL: NAD  SKIN: Warm dry  HEAD: NCAT  EYES: NL inspection  ENT: dry oral mucuosa  NECK: Supple; non tender.  CARD: RRR, no murmurs appreciated  RESP: CTAB  ABD: S/NT no R/G  EXT: no pedal edema, calf appear symmetric non-tender, non-erythema  NEURO: Grossly unremarkable  PSYCH: Cooperative, appropriate. CONSTITUTIONAL: NAD  SKIN: Warm dry  HEAD: NCAT  EYES: NL inspection  ENT: dry oral mucuosa  NECK: Supple; non tender.  CARD: RRR, no murmurs appreciated  RESP: CTAB  ABD: S/NT no R/G  EXT: no pedal edema, calf appear symmetric non-tender, non-erythema  NEURO: Grossly unremarkable  PSYCH: Cooperative, appropriate .

## 2022-10-03 NOTE — REVIEW OF SYSTEMS
[Dyspnea on exertion] : dyspnea during exertion [Chest Discomfort] : chest discomfort [Negative] : Heme/Lymph [Lower Ext Edema] : no extremity edema [Orthopnea] : no orthopnea [PND] : no PND [Syncope] : no syncope

## 2022-10-03 NOTE — ED PROVIDER NOTE - OBJECTIVE STATEMENT
78yo M with PMH of HTN, HLD, DM2 on PO meds presents for 2wk of CP. Constant, pressure, never entirely goes away but never resolves. Not radiating, assoc mild SOB, no diaphoresis. Worsened after 11hr flight from [place] but had prior this before but much less intense. No abd pain, NVDC, LE edema, hx of DVT.

## 2022-10-03 NOTE — H&P ADULT - PROBLEM SELECTOR PLAN 2
WBC 12.56 on admission. Unclear etiology, possibly reactive in setting of ACS. Pt afebrile, with no systemic or localizing S/S of infection.   - Trend WBC with CBC daily  - Monitor off abx at this time  - Pt denies any dysuria or other urinary symptoms, so will hold off on checking UA

## 2022-10-03 NOTE — H&P ADULT - PROBLEM SELECTOR PLAN 7
Pt appears to be on tamsulosin and finasteride at home. Pt not sure of home meds. No S/S of urinary retention at this time.  - C/w tamsulosin 0.4 mg qHS and finasteride 5 mg daily  - Complete Med Rec in AM

## 2022-10-03 NOTE — HISTORY OF PRESENT ILLNESS
[FreeTextEntry1] : 78 yo man presents for followup of shortness of breath / chest pain. He son is Harris (tel: [phone]); daughter is Marjorie (tel: [phone]). Today, he is accompanied by his daughter. \par \par H/o DM (on oral medications; no known complications of DM), HLD, HTN (long-standing), obesity; former smoker, stopped many years ago. No h/o known heart disease, MI, heart failure, PVD, stroke. No bleeding issues. He had COVID.\par \par CV data:\par Echo: minimal MR, mild aortic root dilation, calcified aortic valve with normal opening, normal LA, grossly normal LVEF, no peric effusion\par Stress test: Regadenoson; no chest pain or EKG changes; scan shows a small, mild defect in the mid to distal inferolateral wall (fixed) c/w infarct; LVEF 60% with no segmental wall motion abnormality\par CXR ([facility]) in Oct 2020: no pneumonia; bibasilar atelectasis/scarring; small hiatal hernia\par Labs May 2021: CBC normal\par A1c 6.8%\par TSH 4.75\par chol 146, trig 127, HDL 57, LDL 64 \par Na 138, K 5.2, BUN 14, creat 0.92, LFT's normal\par \par At his last visit in July 2021, the following issues were discussed:\par Chest pain (786.50) (R07.9)\par  · No chest discomfort. Prior stress test shows a small inferior fixed defect. No indication\par     for further CV testing (i.e. cardiac cath). For now, continue to treat risk factors with\par     medications, lifestyle (weight).\par Diabetes (250.00) (E11.9)\par  · Continue with med rx and diet to address his DM. He understands the need for\par     improvement in his diet with an eye towards weight loss.\par Hyperlipemia (272.4) (E78.5)\par  · Continue statin therapy. Excellent recent results.\par Hypertension (401.9) (I10)\par  · BP still not optimally controlled. I have asked him to increase the dose of his ARB/HCTZ\par     back to a full fill per day (100/25 mg daily). Goal BP <130/85 mmHg. Last labs were\par     stable.\par Shortness of breath (786.05) (R06.02)\par  · Chronic CAMACHO, improved over past few months; stable. Echo show normal valve and\par     cardiac function. Recent CXR also unremarkable. No further testing at this time.\par \par Today, Mr. Quiroga presents for f/u, this time with his daughter. He does not have a list of his medications with him today. For the past two weeks, he has noticed pressure in his left chest, near constant, no significant radiation. Seems to wax/wane at times, perhaps improves with deep inspiration. He continues to have CAMACHO / unchanged over the years. No swelling. Last labs, 2 months ago; last A1c 7.3%. States he is compliant with his medications. \par \par EKG today, compared to prior:\par Sinus rhythm  \par First degree A-V block \par Left axis -anterior fascicular block with QRS widening and late transition\par ABNORMAL ECG; No changed c/w to prior\par

## 2022-10-03 NOTE — H&P ADULT - NSICDXPASTMEDICALHX_GEN_ALL_CORE_FT
PAST MEDICAL HISTORY:  BPH (benign prostatic hyperplasia)     Chronic obstructive pulmonary disease (COPD)     DM2 (diabetes mellitus, type 2)     HLD (hyperlipidemia)     HTN (hypertension)

## 2022-10-03 NOTE — ED PROVIDER NOTE - CLINICAL SUMMARY MEDICAL DECISION MAKING FREE TEXT BOX
Ricky: 78yo who presents with chest pain for 2 weeks. HTN, DM, chol. started after 12 hr plain flight. no leg swelling. + increased SOB. PE seem lower likelyhood than cardiac cause but will get ddimer. Will get ekg and troponins.

## 2022-10-03 NOTE — H&P ADULT - PROBLEM SELECTOR PLAN 3
A1c 6.8% in 5/2021 on review of outpatient records. Pt appears to be on Trulicity, Janumet, and ?Farxiga at home. A1c 6.8% in 5/2021 on review of outpatient records. Pt appears to be on Trulicity, Janumet, and Farxiga at home. Pt not sure of home meds.   - Hold Trulicity and oral hypoglycemic agents while inpatient  - Start low-dose ISS and FS checks q6hrs while NPO, qAC TID and qHS if on PO diet  - Check A1c  - Complete Med Rec in AM

## 2022-10-03 NOTE — H&P ADULT - PROBLEM SELECTOR PLAN 1
Pt with 2 weeks of constant left-sided, nonradiating, pressure-like chest pain, though with waxing and waning intensity. Concerning for ACS/unstable angina. EKG without any acute ischemic changes. Hs-trop currently flat at 13 -> 12.  - Cardiology consult obtained, appreciate recs  - S/p  mg and Plavix 600 mg load. Will start ASA 81 mg daily and c/w home Plavix 75 mg daily as per Cardiology recs.  - Heparin gtt started in ED as per Cardiology recs. C/w heparin gtt for now.   - TTE ordered  - Cardiology with plan for cardiac cath on Tuesday, will keep NPO after MN  - Monitor for worsening chest pain and obtain serial EKGs and cardiac enzymes as needed  - Monitor on tele  - C/w Coreg and atorvastatin (therapeutic interchange for rosuvastatin) Pt with 2 weeks of constant left-sided, nonradiating, pressure-like chest pain, though with waxing and waning intensity. Concerning for ACS/unstable angina. EKG without any acute ischemic changes. Hs-trop currently flat at 13 -> 12.  - Cardiology consult obtained, appreciate recs  - S/p  mg and Plavix 600 mg load. Start ASA 81 mg daily and c/w home Plavix 75 mg daily as per Cardiology recs.  - Heparin gtt started in ED as per Cardiology recs. C/w heparin gtt for now.   - TTE ordered  - Cardiology with plan for cardiac cath on Tuesday, will keep NPO after MN  - Monitor for worsening chest pain and obtain serial EKGs and cardiac enzymes as needed  - Monitor on tele  - C/w Coreg and atorvastatin (therapeutic interchange for rosuvastatin) Pt with 2 weeks of constant left-sided, nonradiating, pressure-like chest pain, though with waxing and waning intensity. Concerning for ACS/unstable angina. EKG without any acute ischemic changes. Hs-trop currently flat at 13 -> 12. Low suspicion for PE at this time, as D-dimer 343 (wnl when age adjusted) and pt with no shortness of breath at rest, hypoxia, tachycardia, or tachypnea.   - Cardiology consult obtained, appreciate recs  - S/p  mg and Plavix 600 mg load. Start ASA 81 mg daily and c/w home Plavix 75 mg daily as per Cardiology recs.  - Heparin gtt started in ED as per Cardiology recs. C/w heparin gtt for now.   - TTE ordered  - Cardiology with plan for cardiac cath on Tuesday, will keep NPO after MN  - Monitor for worsening chest pain and obtain serial EKGs and cardiac enzymes as needed  - Monitor on tele  - C/w Coreg and atorvastatin (therapeutic interchange for rosuvastatin)

## 2022-10-03 NOTE — ED ADULT NURSE NOTE - OBJECTIVE STATEMENT
pt with co chest pain x 2 weeks. NSR on cardiac monitor, Pt does not look tachypneic denies sob ,fever or chills. vs wnl. labs drawn, heparin to be started. . Pts son at [facility].

## 2022-10-03 NOTE — H&P ADULT - NSHPREVIEWOFSYSTEMS_GEN_ALL_CORE
Constitutional: No generalized weakness, fevers, chills, or weight loss  Eyes: No visual changes, double vision, or eye pain  Ears, Nose, Mouth, Throat: No runny nose, sinus pain, ear pain, tinnitus, sore throat, dysphagia, or odynophagia  Cardiovascular: No chest pain, palpitations, or LE edema  Respiratory: No cough, wheezing, hemoptysis, or shortness of breath  Gastrointestinal: No abdominal pain, dysphagia, anorexia, nausea/vomiting, diarrhea/constipation, hematemesis, melena, or BRBPR  Genitourinary: No dysuria, frequency, urgency, or hematuria  Musculoskeletal: No joint pain, joint swelling, or decreased ROM  Skin: No pruritus, rashes, lesions, or wounds  Neurologic:  No seizures, headache, paresthesias, numbness, or limb weakness  Psychiatric: No depression, anxiety, difficulty concentrating, anhedonia, or lack of energy  Endocrine: No heat/cold intolerance, mood swings, sweats, polydipsia, or polyuria  Hematologic/lymphatic: No purpura, petechia, or prolonged or excessive bleeding after dental extraction / injury  Allergic/Immunologic: No anaphylaxis or allergic response to materials, foods, animals    Positives and pertinent negatives noted and all other systems negative. Constitutional: No generalized weakness, fevers, chills, or weight loss  Eyes: No visual changes, double vision, or eye pain  Ears, Nose, Mouth, Throat: No runny nose, sinus pain, ear pain, tinnitus, sore throat, dysphagia, or odynophagia  Cardiovascular: +Left-sided chest pain. No palpitations or LE edema.  Respiratory: +Chronic dyspnea on exertion. No cough, wheezing, or hemoptysis.  Gastrointestinal: No abdominal pain, nausea/vomiting, diarrhea/constipation, hematemesis, melena, or BRBPR  Genitourinary: No dysuria, frequency, urgency, or hematuria  Musculoskeletal: No back pain or joint pain, swelling, or decreased ROM  Skin: No pruritus or rashes  Neurologic: No syncope, seizures, headaches, paresthesias, numbness, or limb weakness  Psychiatric: No depression, anxiety, or agitation  Endocrine: No heat/cold intolerance, mood swings, sweats, polydipsia, or polyuria  Hematologic/lymphatic: No purpura, petechia, or prolonged or excessive bleeding after dental extraction / injury  Allergic/Immunologic: No anaphylaxis or allergic response to materials, foods, animals    Positives and pertinent negatives noted and all other systems negative.

## 2022-10-03 NOTE — ED PROVIDER NOTE - NS ED ROS FT
Constitutional:  See HPI  ENMT: No neck pain or stiffness  Cardiac:  +chest pressure  Respiratory:  No cough or respiratory distress. +subjective dyspnea  GI:  No nausea, vomiting, diarrhea or abdominal pain.  MS:  No back pain, no calf pain/swelling.  Neuro:  No headache   Skin:  No skin rash  Except as documented in the HPI,  all other systems are negative

## 2022-10-03 NOTE — H&P ADULT - PROBLEM SELECTOR PLAN 9
- DVT ppx: On heparin gtt  - Diet: NPO after MN pending cardiac cath  - GI ppx: Pt appears to be on Dexilant 60 mg daily. C/w PPI with PO Protonix 40 mg daily.   - PT consult

## 2022-10-03 NOTE — H&P ADULT - PROBLEM SELECTOR PLAN 5
Pt appears to be on rosuvastatin at home. Pt not sure of home meds.  - C/w atorvastatin 80 mg qHS (therapeutic interchange for rosuvastatin 20 mg qHS)  - Complete Med Rec in AM

## 2022-10-03 NOTE — H&P ADULT - NSICDXFAMILYHX_GEN_ALL_CORE_FT
FAMILY HISTORY:  Father  Still living? Unknown  Family history of myocardial infarction, Age at diagnosis: Age Unknown    Sibling  Still living? Unknown  Family history of myocardial infarction, Age at diagnosis: Age Unknown

## 2022-10-03 NOTE — ED PROVIDER NOTE - ATTENDING CONTRIBUTION TO CARE
I performed a history and physical exam of the patient and discussed their management with the resident and /or advanced care provider. I reviewed the resident and /or ACP's note and agree with the documented findings and plan of care. My medical decision making and observations are found above.  Lungs clear, abd soft, no palpable chest pain.

## 2022-10-03 NOTE — H&P ADULT - NSHPPHYSICALEXAM_GEN_ALL_CORE
Vital Signs Last 24 Hrs  T(C): 36.7 (03 Oct 2022 21:20), Max: 36.7 (03 Oct 2022 14:08)  T(F): 98.1 (03 Oct 2022 21:20), Max: 98.1 (03 Oct 2022 21:20)  HR: 95 (03 Oct 2022 21:20) (74 - 97)  BP: 125/90 (03 Oct 2022 21:20) (121/74 - 127/91)  BP(mean): --  RR: 17 (03 Oct 2022 21:20) (16 - 18)  SpO2: 99% (03 Oct 2022 21:20) (97% - 99%)    PHYSICAL EXAM:  General: Awake and alert.  No acute distress.  Head: Normocephalic, atraumatic.    Eyes: PERRL.  EOMI.  No scleral icterus.  No conjunctival pallor.  Mouth: Moist MM.  No oropharyngeal exudates.    Neck: Large circumference.  Supple.  Full range of motion.  No JVD.  No LAD.  No thyromegaly.  Trachea midline.    Heart: No reproducible chest pain.  RRR.  Normal S1 and S2.  No murmurs, rubs, or gallops.  No LE edema b/l.   Lungs: Nonlabored breathing.  Good inspiratory effort.  CTAB.  No wheezes, crackles, or rhonchi.    Abdomen: BS+, soft, nontender with no rebound or guarding, nondistended.  No hepatomegaly.   Skin: Warm and dry.  No rashes.  Extremities: No cyanosis.  2+ peripheral pulses b/l.  Musculoskeletal: No joint deformities.  No spinal or paraspinal tenderness.  Neuro: A&Ox3.  CN II-XII intact.  5/5 motor strength in UE and LE b/l.  Tactile sensation intact in UE and LE b/l.  No focal deficits.  Psychiatric: Normal mood, full affect.

## 2022-10-03 NOTE — CONSULT NOTE ADULT - ASSESSMENT
The patient is a 76yo male with a PMH of HTN, HLD, and DM presenting to the ED with chest pain.       Recommendations:   - The patient's chest pain does have features concerning for angina  - EKG: Not concerning for any acute ST/T-wave changes  - Troponin: Please trend Troponin to peak   - Please obtain a repeat EKG for any chest pain recurrence   - Aspirin load 325mg; please load additionally with Plavix 600mg, then continue aspirin 81mg  and Plavix 75mg daily   - Heparin drip per ACS protocol   - Echo can be ordered, to be obtained in the AM   - Please call Cardiology with any dynamic EKG/Troponin changes or return of symptoms   - Rapid COVID in preparation for possible cath  - Please keep NPO after midnight   - Continue home Statin and Coreg  - Will likely be added on cath schedule for tomorrow       Iris Quick MD   Cardiology Fellow       This consult note is preliminary until cosigned by the attending cardiologist. The patient is a 78yo male with a PMH of HTN, HLD, and DM presenting to the ED with chest pain.       Recommendations:   - The patient's chest pain does have features concerning for angina  - EKG: Not concerning for any acute ST/T-wave changes  - Troponin: Please trend Troponin to peak   - Please obtain a repeat EKG for any chest pain recurrence   - Aspirin load 325mg; please load additionally with Plavix 600mg, then continue aspirin 81mg  and Plavix 75mg daily   - Heparin drip per ACS protocol   - Echo can be ordered, to be obtained in the AM   - Please call Cardiology with any dynamic EKG/Troponin changes or return of symptoms   - Rapid COVID in preparation for possible cath  - Please keep NPO after midnight   - Continue home Statin and Coreg  - Will likely be added on cath schedule for tomorrow       Iris Quick MD   Cardiology Fellow       This consult note is preliminary until cosigned by the attending cardiologist.    This patient was seen and examined personally by me and the plan was discussed with the fellow and/or resident above. Amendments were made as necessary to the above. Agree with the excellent note and plan above. Ischemic eval pending.    Ge Durant MD, MPhil, [facility]  Cardiologist, [facility]  ; [facility] and [facility]  Email: [email or web address] on-service contact/call information, go to [email or web address] and use "[email or web address]" to login.  Outpatient Cardiology appointments, call [phone] to arrange with a colleague; I do not have outpatient Cardiology clinic.

## 2022-10-03 NOTE — H&P ADULT - PROBLEM SELECTOR PLAN 4
BPs in acceptable range on admission. Pt appears to be on losartan-HCTZ, PO Lasix, and carvedilol at home. Pt not sure of home meds.  - C/w losartan 100 mg daily and carvedilol 6.25 md BID  - Hold HCTZ and PO Lasix for now given NPO status and cardiac cath on Tuesday with contrast load  - Monitor VS q4hrs for now  - Complete Med Rec in AM

## 2022-10-03 NOTE — H&P ADULT - NSHPLABSRESULTS_GEN_ALL_CORE
EKG personally reviewed.  SR with 1st deg AVB with occasional PVCs at 73 bpm, LAD, QTc 447 ms.    Labs personally reviewed.                        14.1   9.00  )-----------( 272      ( 04 Oct 2022 01:55 )             45.5     10-04    135  |  99  |  14  ----------------------------<  132<H>  4.2   |  26  |  0.80    Ca    9.1      04 Oct 2022 02:20  Phos  3.8     10-04  Mg     1.80     10-04    TPro  6.9  /  Alb  4.3  /  TBili  0.4  /  DBili  x   /  AST  19  /  ALT  19  /  AlkPhos  42  10-03    Imaging personally reviewed.  ACC: [id number] EXAM:  XR CHEST PA LAT 2V                        PROCEDURE DATE:  10/03/2022    INTERPRETATION:  CLINICAL INFORMATION: Chest pain.  TECHNIQUE: PA and lateral radiographs of the chest.  COMPARISON: No comparison available.    FINDINGS:  The lungs are clear. No pleural effusion or pneumothorax.  Normal cardiomediastinal silhouette.  Osseous degenerative changes.    IMPRESSION:  Clear lungs.

## 2022-10-03 NOTE — ED PROVIDER NOTE - PROGRESS NOTE DETAILS
Tonio PGY2: per cards, will be a cath tmw. asking for ASA/plavix, hep drip. Will order coags T&S. DDimer wt appropriate, more like cards. EKG w/ PVC otherwise no acute ischemic changes.    Cards ask for admit to med, house cards Dr Rushing will do cath.   PCP Dr Meyeouvick

## 2022-10-03 NOTE — CONSULT NOTE ADULT - SUBJECTIVE AND OBJECTIVE BOX
HPI: The patient is a 78yo male with a PMH of HTN, HLD, and DM who presented to the ED from his outpatient cardiology appointment with Dr. Rushing, for chest pain. The patient's chest began 2 weeks after retuning from a 12h flight. He describes the pain as a pressure, like someone sitting on his chest. The pain improves with lying down and worsens with activity, he also has accompanying shortness of breath with walking and climbing stairs. He denies any nausea, vomiting, diaphoresis. Per chart review, the patient has had an abnormal stress test in the past, but no angiogram.       PMHx:   HTN (hypertension)    HLD (hyperlipidemia)    DM2 (diabetes mellitus, type 2)        PSHx:   No significant past surgical history        Allergies:  No Known Allergies        Social History: Denies current smoking or drug use, occasional alcohol use      REVIEW OF SYSTEMS:  CONSTITUTIONAL: No weakness, fevers or chills  EYES/ENT: No visual changes;  No dysphagia  NECK: No pain or stiffness  RESPIRATORY: No cough, wheezing, hemoptysis; + shortness of breath with acivity  CARDIOVASCULAR: + chest pain intermittently, no palpitations; No lower extremity edema  GASTROINTESTINAL: No abdominal or epigastric pain. No nausea, vomiting, or hematemesis; No diarrhea or constipation. No melena or hematochezia.  BACK: No back pain  GENITOURINARY: No dysuria, frequency or hematuria  NEUROLOGICAL: No numbness or weakness  SKIN: No itching, burning, rashes, or lesions   All other review of systems is negative unless indicated above.    Physical Exam:  T(F): 98 (10-03), Max: 98 (10-03)  HR: 74 (10-03) (74 - 74)  BP: 121/74 (10-03) (121/74 - 121/74)  RR: 16 (10-03)  SpO2: 97% (10-03)  GENERAL: No acute distress, well-developed  HEAD:  Atraumatic, Normocephalic  ENT:  conjunctiva and sclera clear, Neck supple, No JVD, moist mucosa  CHEST/LUNG: Clear to auscultation bilaterally; No wheeze, equal breath sounds bilaterally   HEART: Regular rate and rhythm; No murmurs, rubs, or gallops  ABDOMEN: Soft, Nontender, Nondistended  EXTREMITIES:  No clubbing, cyanosis, or edema  PSYCH: Nl behavior, nl affect  NEUROLOGY: AAOx3  SKIN: Normal color, No rashes or lesions                        15.0   12.56 )-----------( 307      ( 03 Oct 2022 16:05 )             48.1     10-03    137  |  99  |  14  ----------------------------<  106<H>  4.3   |  25  |  0.85    Ca    9.5      03 Oct 2022 16:05  Mg     1.50     10-03    TPro  6.9  /  Alb  4.3  /  TBili  0.4  /  DBili  x   /  AST  19  /  ALT  19  /  AlkPhos  42  10-03        CARDIAC MARKERS ( 03 Oct 2022 16:05 )  13 ng/L / x     / x     / x     / x     / x            Serum Pro-Brain Natriuretic Peptide: 45 pg/mL (10-03 @ 16:05)

## 2022-10-03 NOTE — ED ADULT NURSE NOTE - NSIMPLEMENTINTERV_GEN_ALL_ED
Implemented All Universal Safety Interventions:  [place] to call system. Call bell, personal items and telephone within reach. Instruct patient to call for assistance. Room bathroom lighting operational. Non-slip footwear when patient is off stretcher. Physically safe environment: no spills, clutter or unnecessary equipment. Stretcher in lowest position, wheels locked, appropriate side rails in place.

## 2022-10-03 NOTE — H&P ADULT - ASSESSMENT
76 yo man, speaks both English and [place], with history of HTN, HLD, type 2 DM (not on insulin), BPH, ?COPD (PFTs in 2020 with moderate obstructive lung defect), and tobacco smoking (quit ~25 years ago) presents with 2 weeks of chest pain, admitted with concern for ACS/unstable angina, currently on heparin gtt.

## 2022-10-03 NOTE — PHYSICAL EXAM
[Well Developed] : well developed [Well Nourished] : well nourished [No Acute Distress] : no acute distress [Obese] : obese [Normal Conjunctiva] : normal conjunctiva [Normal Venous Pressure] : normal venous pressure [No Carotid Bruit] : no carotid bruit [Normal S1, S2] : normal S1, S2 [No Murmur] : no murmur [No Rub] : no rub [No Gallop] : no gallop [Clear Lung Fields] : clear lung fields [Good Air Entry] : good air entry [No Respiratory Distress] : no respiratory distress  [Soft] : abdomen soft [Non Tender] : non-tender [No Masses/organomegaly] : no masses/organomegaly [Normal Bowel Sounds] : normal bowel sounds [Normal Gait] : normal gait [No Edema] : no edema [No Cyanosis] : no cyanosis [No Clubbing] : no clubbing [No Varicosities] : no varicosities [No Rash] : no rash [No Skin Lesions] : no skin lesions [Moves all extremities] : moves all extremities [No Focal Deficits] : no focal deficits [Normal Speech] : normal speech [Alert and Oriented] : alert and oriented [Normal memory] : normal memory [de-identified] : overweight

## 2022-10-03 NOTE — H&P ADULT - PROBLEM SELECTOR PLAN 8
Pt unable to provide list of current home meds. Outpatient visit note from pt's cardiologist contains a list of current meds, though with discrepancies with list on Surescripts.  - Will email Med Rec pharmacist for assistance with Med Rec

## 2022-10-03 NOTE — H&P ADULT - HISTORY OF PRESENT ILLNESS
78 yo man, speaks both English and Afghan, with history of HTN, HLD, type 2 DM (not on insulin), BPH, ?COPD (PFTs in 2020 with moderate obstructive lung defect), and tobacco smoking (quit ~25 years ago) presents with 2 weeks of chest pain, which left-sided, nonradiating, pressure-like, and constant, though the intensity waxes and wanes. Pt states that the chest pain started 2 weeks ago, minutes after his return flight from [place] took off, and has persisted since then, never fully dissipating. Pt had gone to [place] 4 weeks ago and was there for 2 weeks without experiencing any chest pain. The chest pain seems to worsen with activity and improve with rest, especially lying down, or with deep inspiration. Pt denies any associated shortness of breath with the chest pain, has had dyspnea on exertion over the years, though without any recent worsening. Pt also denies any palpitations, diaphoresis, nausea, vomiting, back pain, or LE pain/swelling/redness. Pt saw his cardiologist, Dr Carlos A Rushing, today for the chest pain and was referred to the ED. Pt notes extensive family history of MI, with his father passing away of an MI at age 68 and 2 brothers and a sister also with history of an MI. Pt had a nuclear stress test in Jan 2021 that showed a fixed small, mild defect in mid to distal inferolateral wall, suggestive of infarct, but never had a cardiac cath. TTE in Jan 2021 with grossly normal LVSF.    In the ED,  T 98, HR 74-97, -127/74-91, RR 16-18, SpO2 97-98% RA.  Hs-trops 13 -> 12. Pro-BNP 45.   76 yo man, speaks both English and Sammarinese, with history of HTN, HLD, type 2 DM (not on insulin), BPH, ?COPD (PFTs in 2020 with moderate obstructive lung defect), and tobacco smoking (quit ~25 years ago) presents with 2 weeks of chest pain, which left-sided, nonradiating, pressure-like, and constant, though the intensity waxes and wanes. Pt states that the chest pain started 2 weeks ago, minutes after his return flight from [place] off, and has persisted since then, never fully dissipating. Pt had gone to [place] 4 weeks ago and was there for 2 weeks without experiencing any chest pain. The chest pain seems to worsen with activity and improve with rest, especially lying down, or with deep inspiration. Pt denies any associated shortness of breath with the chest pain, has had dyspnea on exertion over the years, though without any recent worsening. Pt also denies any palpitations, diaphoresis, lightheadedness, dizziness, syncope, nausea, vomiting, back pain, or LE pain/swelling/redness. Pt saw his cardiologist, Dr Carlos A Rushing, today for the chest pain and was referred to the ED. Pt notes extensive family history of MI, with his father passing away of an MI at age 68 and 2 brothers and a sister also with history of an MI. Pt had a nuclear stress test in Jan 2021 that showed a fixed small, mild defect in mid to distal inferolateral wall, suggestive of infarct, but never had a cardiac cath. TTE in Jan 2021 with grossly normal LVSF. Pt denies any recent fevers, chills, cough, abdominal pain, nausea, vomiting, diarrhea, constipation, melena, BRBPR, urinary symptoms, or back pain.    In the ED,  T 98, HR 74-97, -127/74-91, RR 16-18, SpO2 97-98% RA.  Hs-trops 13 -> 12. Pro-BNP 45.   78 yo man, speaks both English and French, with history of HTN, HLD, type 2 DM (not on insulin), BPH, ?COPD (PFTs in 2020 with moderate obstructive lung defect), and tobacco smoking (quit ~25 years ago) presents with 2 weeks of chest pain, which is left-sided, nonradiating, pressure-like, and constant, though the intensity waxes and wanes. Pt states that the chest pain started 2 weeks ago, minutes after his return flight from [place] off, and has persisted since then, never fully dissipating. Pt had gone to [place] 4 weeks ago and was there for 2 weeks without experiencing any chest pain. The chest pain seems to worsen with activity and improve with rest, especially lying down, or with deep inspiration. Pt denies any associated shortness of breath with the chest pain, has had dyspnea on exertion over the years, though without any recent worsening. Pt also denies any palpitations, diaphoresis, lightheadedness, dizziness, syncope, nausea, vomiting, back pain, or LE pain/swelling/redness. Pt saw his cardiologist, Dr Carlos A Rushing, today for the chest pain and was referred to the ED. Pt notes extensive family history of MI, with his father passing away of an MI at age 68 and 2 brothers and a sister also with history of an MI. Pt had a nuclear stress test in Jan 2021 that showed a fixed small, mild defect in mid to distal inferolateral wall, suggestive of infarct, but never had a cardiac cath. TTE in Jan 2021 with grossly normal LVSF. Pt denies any recent fevers, chills, cough, abdominal pain, nausea, vomiting, diarrhea, constipation, melena, BRBPR, urinary symptoms, or back pain.    In the ED,  T 98, HR 74-97, -127/74-91, RR 16-18, SpO2 97-98% RA.  Hs-trops 13 -> 12. Pro-BNP 45.

## 2022-10-03 NOTE — ED ADULT TRIAGE NOTE - CHIEF COMPLAINT QUOTE
Pt. c/o intermittent chest pain, sob and fatigue x 2wks. States it started after coming back from [place]. Denies dizziness, cough, n/v.

## 2022-10-03 NOTE — H&P ADULT - PROBLEM SELECTOR PLAN 6
Pt with chronic dyspnea on exertion, has been present for several years, denies any recent worsening. Per review of outpatient records, pt had PFTs in 2020 that showed a moderate obstructive lung defect). According to Surescripts, pt appears to be on Breo Ellipta at home.  - C/w Symbicort BID (therapeutic interchange for Breo Ellipta)  - Supplemental O2 PRN  - Complete Med Rec in AM

## 2022-10-04 ENCOUNTER — TRANSCRIPTION ENCOUNTER (OUTPATIENT)
Age: 77
End: 2022-10-04

## 2022-10-04 VITALS
DIASTOLIC BLOOD PRESSURE: 81 MMHG | SYSTOLIC BLOOD PRESSURE: 137 MMHG | TEMPERATURE: 98 F | OXYGEN SATURATION: 96 % | RESPIRATION RATE: 17 BRPM | HEART RATE: 58 BPM

## 2022-10-04 DIAGNOSIS — E78.5 HYPERLIPIDEMIA, UNSPECIFIED: ICD-10-CM

## 2022-10-04 DIAGNOSIS — I10 ESSENTIAL (PRIMARY) HYPERTENSION: ICD-10-CM

## 2022-10-04 DIAGNOSIS — N40.0 BENIGN PROSTATIC HYPERPLASIA WITHOUT LOWER URINARY TRACT SYMPTOMS: ICD-10-CM

## 2022-10-04 DIAGNOSIS — E11.9 TYPE 2 DIABETES MELLITUS WITHOUT COMPLICATIONS: ICD-10-CM

## 2022-10-04 DIAGNOSIS — Z29.9 ENCOUNTER FOR PROPHYLACTIC MEASURES, UNSPECIFIED: ICD-10-CM

## 2022-10-04 DIAGNOSIS — D72.829 ELEVATED WHITE BLOOD CELL COUNT, UNSPECIFIED: ICD-10-CM

## 2022-10-04 DIAGNOSIS — Z79.899 OTHER LONG TERM (CURRENT) DRUG THERAPY: ICD-10-CM

## 2022-10-04 DIAGNOSIS — R06.09 OTHER FORMS OF DYSPNEA: ICD-10-CM

## 2022-10-04 DIAGNOSIS — R07.9 CHEST PAIN, UNSPECIFIED: ICD-10-CM

## 2022-10-04 LAB
A1C WITH ESTIMATED AVERAGE GLUCOSE RESULT: 7.3 % — HIGH (ref 4–5.6)
ANION GAP SERPL CALC-SCNC: 10 MMOL/L — SIGNIFICANT CHANGE UP (ref 7–14)
APTT BLD: 50.9 SEC — HIGH (ref 27–36.3)
APTT BLD: 59.5 SEC — HIGH (ref 27–36.3)
BUN SERPL-MCNC: 14 MG/DL — SIGNIFICANT CHANGE UP (ref 7–23)
CALCIUM SERPL-MCNC: 9.1 MG/DL — SIGNIFICANT CHANGE UP (ref 8.4–10.5)
CHLORIDE SERPL-SCNC: 99 MMOL/L — SIGNIFICANT CHANGE UP (ref 98–107)
CHOLEST SERPL-MCNC: 148 MG/DL — SIGNIFICANT CHANGE UP
CO2 SERPL-SCNC: 26 MMOL/L — SIGNIFICANT CHANGE UP (ref 22–31)
CREAT SERPL-MCNC: 0.8 MG/DL — SIGNIFICANT CHANGE UP (ref 0.5–1.3)
EGFR: 92 ML/MIN/1.73M2 — SIGNIFICANT CHANGE UP
ESTIMATED AVERAGE GLUCOSE: 163 — SIGNIFICANT CHANGE UP
GLUCOSE SERPL-MCNC: 132 MG/DL — HIGH (ref 70–99)
HCT VFR BLD CALC: 45.5 % — SIGNIFICANT CHANGE UP (ref 39–50)
HDLC SERPL-MCNC: 58 MG/DL — SIGNIFICANT CHANGE UP
HGB BLD-MCNC: 14.1 G/DL — SIGNIFICANT CHANGE UP (ref 13–17)
INR BLD: 1.09 RATIO — SIGNIFICANT CHANGE UP (ref 0.88–1.16)
LIPID PNL WITH DIRECT LDL SERPL: 70 MG/DL — SIGNIFICANT CHANGE UP
MAGNESIUM SERPL-MCNC: 1.8 MG/DL — SIGNIFICANT CHANGE UP (ref 1.6–2.6)
MCHC RBC-ENTMCNC: 26.4 PG — LOW (ref 27–34)
MCHC RBC-ENTMCNC: 31 GM/DL — LOW (ref 32–36)
MCV RBC AUTO: 85.2 FL — SIGNIFICANT CHANGE UP (ref 80–100)
NON HDL CHOLESTEROL: 90 MG/DL — SIGNIFICANT CHANGE UP
NRBC # BLD: 0 /100 WBCS — SIGNIFICANT CHANGE UP (ref 0–0)
NRBC # FLD: 0 K/UL — SIGNIFICANT CHANGE UP (ref 0–0)
PHOSPHATE SERPL-MCNC: 3.8 MG/DL — SIGNIFICANT CHANGE UP (ref 2.5–4.5)
PLATELET # BLD AUTO: 272 K/UL — SIGNIFICANT CHANGE UP (ref 150–400)
POTASSIUM SERPL-MCNC: 4.2 MMOL/L — SIGNIFICANT CHANGE UP (ref 3.5–5.3)
POTASSIUM SERPL-SCNC: 4.2 MMOL/L — SIGNIFICANT CHANGE UP (ref 3.5–5.3)
PROTHROM AB SERPL-ACNC: 12.6 SEC — SIGNIFICANT CHANGE UP (ref 10.5–13.4)
RBC # BLD: 5.34 M/UL — SIGNIFICANT CHANGE UP (ref 4.2–5.8)
RBC # FLD: 12.8 % — SIGNIFICANT CHANGE UP (ref 10.3–14.5)
SODIUM SERPL-SCNC: 135 MMOL/L — SIGNIFICANT CHANGE UP (ref 135–145)
TRIGL SERPL-MCNC: 101 MG/DL — SIGNIFICANT CHANGE UP
WBC # BLD: 9 K/UL — SIGNIFICANT CHANGE UP (ref 3.8–10.5)
WBC # FLD AUTO: 9 K/UL — SIGNIFICANT CHANGE UP (ref 3.8–10.5)

## 2022-10-04 PROCEDURE — 93458 L HRT ARTERY/VENTRICLE ANGIO: CPT | Mod: 26

## 2022-10-04 PROCEDURE — 99233 SBSQ HOSP IP/OBS HIGH 50: CPT

## 2022-10-04 PROCEDURE — 99223 1ST HOSP IP/OBS HIGH 75: CPT

## 2022-10-04 RX ORDER — SITAGLIPTIN AND METFORMIN HYDROCHLORIDE 500; 50 MG/1; MG/1
1 TABLET, FILM COATED ORAL
Qty: 0 | Refills: 0 | DISCHARGE

## 2022-10-04 RX ORDER — FLUTICASONE PROPIONATE 50 MCG
1 SPRAY, SUSPENSION NASAL
Qty: 0 | Refills: 0 | DISCHARGE

## 2022-10-04 RX ORDER — FINASTERIDE 5 MG/1
1 TABLET, FILM COATED ORAL
Qty: 0 | Refills: 0 | DISCHARGE

## 2022-10-04 RX ORDER — FLUTICASONE FUROATE AND VILANTEROL TRIFENATATE 100; 25 UG/1; UG/1
1 POWDER RESPIRATORY (INHALATION)
Qty: 0 | Refills: 0 | DISCHARGE

## 2022-10-04 RX ORDER — ASPIRIN/CALCIUM CARB/MAGNESIUM 324 MG
1 TABLET ORAL
Qty: 30 | Refills: 0
Start: 2022-10-04 | End: 2022-11-02

## 2022-10-04 RX ORDER — DEXLANSOPRAZOLE 30 MG/1
1 CAPSULE, DELAYED RELEASE ORAL
Qty: 0 | Refills: 0 | DISCHARGE

## 2022-10-04 RX ORDER — ATORVASTATIN CALCIUM 80 MG/1
80 TABLET, FILM COATED ORAL AT BEDTIME
Refills: 0 | Status: DISCONTINUED | OUTPATIENT
Start: 2022-10-04 | End: 2022-10-04

## 2022-10-04 RX ORDER — CLOPIDOGREL BISULFATE 75 MG/1
75 TABLET, FILM COATED ORAL DAILY
Refills: 0 | Status: DISCONTINUED | OUTPATIENT
Start: 2022-10-04 | End: 2022-10-04

## 2022-10-04 RX ORDER — ROSUVASTATIN CALCIUM 5 MG/1
1 TABLET ORAL
Qty: 0 | Refills: 0 | DISCHARGE

## 2022-10-04 RX ORDER — CARVEDILOL PHOSPHATE 80 MG/1
1 CAPSULE, EXTENDED RELEASE ORAL
Qty: 0 | Refills: 0 | DISCHARGE

## 2022-10-04 RX ORDER — PANTOPRAZOLE SODIUM 20 MG/1
40 TABLET, DELAYED RELEASE ORAL
Refills: 0 | Status: DISCONTINUED | OUTPATIENT
Start: 2022-10-04 | End: 2022-10-04

## 2022-10-04 RX ORDER — ASPIRIN/CALCIUM CARB/MAGNESIUM 324 MG
81 TABLET ORAL DAILY
Refills: 0 | Status: DISCONTINUED | OUTPATIENT
Start: 2022-10-04 | End: 2022-10-04

## 2022-10-04 RX ORDER — DEXTROSE 50 % IN WATER 50 %
15 SYRINGE (ML) INTRAVENOUS ONCE
Refills: 0 | Status: DISCONTINUED | OUTPATIENT
Start: 2022-10-04 | End: 2022-10-04

## 2022-10-04 RX ORDER — TAMSULOSIN HYDROCHLORIDE 0.4 MG/1
0.4 CAPSULE ORAL AT BEDTIME
Refills: 0 | Status: DISCONTINUED | OUTPATIENT
Start: 2022-10-04 | End: 2022-10-04

## 2022-10-04 RX ORDER — DULAGLUTIDE 4.5 MG/.5ML
1.5 INJECTION, SOLUTION SUBCUTANEOUS
Qty: 0 | Refills: 0 | DISCHARGE

## 2022-10-04 RX ORDER — SODIUM CHLORIDE 9 MG/ML
1000 INJECTION, SOLUTION INTRAVENOUS
Refills: 0 | Status: DISCONTINUED | OUTPATIENT
Start: 2022-10-04 | End: 2022-10-04

## 2022-10-04 RX ORDER — BUDESONIDE AND FORMOTEROL FUMARATE DIHYDRATE 160; 4.5 UG/1; UG/1
2 AEROSOL RESPIRATORY (INHALATION)
Refills: 0 | Status: DISCONTINUED | OUTPATIENT
Start: 2022-10-04 | End: 2022-10-04

## 2022-10-04 RX ORDER — GLUCAGON INJECTION, SOLUTION 0.5 MG/.1ML
1 INJECTION, SOLUTION SUBCUTANEOUS ONCE
Refills: 0 | Status: DISCONTINUED | OUTPATIENT
Start: 2022-10-04 | End: 2022-10-04

## 2022-10-04 RX ORDER — FUROSEMIDE 40 MG
1 TABLET ORAL
Qty: 0 | Refills: 0 | DISCHARGE

## 2022-10-04 RX ORDER — DEXTROSE 50 % IN WATER 50 %
25 SYRINGE (ML) INTRAVENOUS ONCE
Refills: 0 | Status: DISCONTINUED | OUTPATIENT
Start: 2022-10-04 | End: 2022-10-04

## 2022-10-04 RX ORDER — DEXTROSE 50 % IN WATER 50 %
12.5 SYRINGE (ML) INTRAVENOUS ONCE
Refills: 0 | Status: DISCONTINUED | OUTPATIENT
Start: 2022-10-04 | End: 2022-10-04

## 2022-10-04 RX ORDER — FINASTERIDE 5 MG/1
5 TABLET, FILM COATED ORAL DAILY
Refills: 0 | Status: DISCONTINUED | OUTPATIENT
Start: 2022-10-04 | End: 2022-10-04

## 2022-10-04 RX ORDER — INSULIN LISPRO 100/ML
VIAL (ML) SUBCUTANEOUS EVERY 6 HOURS
Refills: 0 | Status: DISCONTINUED | OUTPATIENT
Start: 2022-10-04 | End: 2022-10-04

## 2022-10-04 RX ORDER — SODIUM CHLORIDE 9 MG/ML
1000 INJECTION INTRAMUSCULAR; INTRAVENOUS; SUBCUTANEOUS
Refills: 0 | Status: DISCONTINUED | OUTPATIENT
Start: 2022-10-04 | End: 2022-10-04

## 2022-10-04 RX ORDER — ICOSAPENT ETHYL 500 MG/1
2 CAPSULE, LIQUID FILLED ORAL
Qty: 0 | Refills: 0 | DISCHARGE

## 2022-10-04 RX ORDER — PLECANATIDE 3 MG/1
1 TABLET ORAL
Qty: 0 | Refills: 0 | DISCHARGE

## 2022-10-04 RX ORDER — CLOPIDOGREL BISULFATE 75 MG/1
1 TABLET, FILM COATED ORAL
Qty: 0 | Refills: 0 | DISCHARGE

## 2022-10-04 RX ORDER — TAMSULOSIN HYDROCHLORIDE 0.4 MG/1
1 CAPSULE ORAL
Qty: 0 | Refills: 0 | DISCHARGE

## 2022-10-04 RX ORDER — CARVEDILOL PHOSPHATE 80 MG/1
6.25 CAPSULE, EXTENDED RELEASE ORAL EVERY 12 HOURS
Refills: 0 | Status: DISCONTINUED | OUTPATIENT
Start: 2022-10-04 | End: 2022-10-04

## 2022-10-04 RX ORDER — FINASTERIDE 5 MG/1
1 TABLET, FILM COATED ORAL
Qty: 0 | Refills: 0 | DISCHARGE
Start: 2022-10-04

## 2022-10-04 RX ADMIN — CARVEDILOL PHOSPHATE 6.25 MILLIGRAM(S): 80 CAPSULE, EXTENDED RELEASE ORAL at 06:06

## 2022-10-04 RX ADMIN — CLOPIDOGREL BISULFATE 75 MILLIGRAM(S): 75 TABLET, FILM COATED ORAL at 12:35

## 2022-10-04 RX ADMIN — Medication 81 MILLIGRAM(S): at 12:35

## 2022-10-04 RX ADMIN — BUDESONIDE AND FORMOTEROL FUMARATE DIHYDRATE 2 PUFF(S): 160; 4.5 AEROSOL RESPIRATORY (INHALATION) at 09:58

## 2022-10-04 RX ADMIN — SODIUM CHLORIDE 75 MILLILITER(S): 9 INJECTION INTRAMUSCULAR; INTRAVENOUS; SUBCUTANEOUS at 16:03

## 2022-10-04 RX ADMIN — HEPARIN SODIUM 1200 UNIT(S)/HR: 5000 INJECTION INTRAVENOUS; SUBCUTANEOUS at 10:14

## 2022-10-04 RX ADMIN — HEPARIN SODIUM 1200 UNIT(S)/HR: 5000 INJECTION INTRAVENOUS; SUBCUTANEOUS at 03:04

## 2022-10-04 NOTE — PROGRESS NOTE ADULT - PROBLEM SELECTOR PLAN 9
- DVT ppx: On heparin gtt  - Diet: NPO pending cardiac cath  - GI ppx: Pt appears to be on Dexilant 60 mg daily. C/w PPI with PO Protonix 40 mg daily.   - PT consult    Dispo- pending further medical optimization

## 2022-10-04 NOTE — PROGRESS NOTE ADULT - PROBLEM SELECTOR PLAN 8
Pt unable to provide list of current home meds. Outpatient visit note from pt's cardiologist contains a list of current meds, though with discrepancies with list on Surescripts.  - Will f/u  Med Rec

## 2022-10-04 NOTE — DISCHARGE NOTE NURSING/CASE MANAGEMENT/SOCIAL WORK - NSDCPEFALRISK_GEN_ALL_CORE
For information on Fall & Injury Prevention, visit: [email or web address] OR  [email or web address] OR  https://www.cdc.gov/steadi/patient.html

## 2022-10-04 NOTE — DISCHARGE NOTE PROVIDER - NSDCFUSCHEDAPPT_GEN_ALL_CORE_FT
Carlos A Rushing  [facility]  CARDIOLOGY [address]  Scheduled Appointment: 10/24/2022    Carlos A Rushing  [facility]  CARDIOLOGY [address]  Scheduled Appointment: 11/10/2022

## 2022-10-04 NOTE — DISCHARGE NOTE PROVIDER - NSDCCPCAREPLAN_GEN_ALL_CORE_FT
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 Medical management recommended at this time, with aspirin and Rosuvastatin as prescribed. Follow up with your cardiologist and PCP in 1-2 weeks.      SECONDARY DISCHARGE DIAGNOSES  Diagnosis: Type 2 diabetes mellitus treated without insulin  Assessment and Plan of Treatment: Continue home diabetes medications. Follow up with PCP in 1-2 weeks for routine follow up.     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 Medical management recommended at this time, with aspirin and Rosuvastatin as prescribed. Follow up with your cardiologist and PCP in 1-2 weeks.      SECONDARY DISCHARGE DIAGNOSES  Diagnosis: Type 2 diabetes mellitus treated without insulin  Assessment and Plan of Treatment: Restart Janumet in 3 DAYS. Do not restart until friday 10/7/22. Follow up with PCP in 1-2 weeks for routine follow up.

## 2022-10-04 NOTE — DISCHARGE NOTE PROVIDER - NSDCMRMEDTOKEN_GEN_ALL_CORE_FT
aspirin 81 mg oral delayed release tablet: 1 tab(s) orally once a day  Breo Ellipta 200 mcg-25 mcg/inh inhalation powder: 1 puff(s) inhaled once a day  carvedilol 6.25 mg oral tablet: 1 tab(s) orally 2 times a day  Dexilant 60 mg oral delayed release capsule: 1 cap(s) orally once a day (in the morning)  finasteride 5 mg oral tablet: 1 tab(s) orally once a day  fluticasone 50 mcg/inh nasal spray: 1 spray(s) nasal once a day, As Needed  Janumet 50 mg-500 mg oral tablet: 1 tab(s) orally 2 times a day  rosuvastatin 20 mg oral tablet: 1 tab(s) orally once a day  tamsulosin 0.4 mg oral capsule: 1 cap(s) orally once a day (at bedtime)  Trulicity Pen 1.5 mg/0.5 mL subcutaneous solution: 1.5 milligram(s) subcutaneous once a week

## 2022-10-04 NOTE — PROGRESS NOTE ADULT - SUBJECTIVE AND OBJECTIVE BOX
Patient is a 77y old  Male who presents with a chief complaint of Chest pain (03 Oct 2022 23:44)      SUBJECTIVE / OVERNIGHT EVENTS: No current CP. No SOB, n/v or abdominal pain    Review of Systems:     MEDICATIONS  (STANDING):  aspirin enteric coated 81 milliGRAM(s) Oral daily  atorvastatin 80 milliGRAM(s) Oral at bedtime  budesonide 160 MICROgram(s)/formoterol 4.5 MICROgram(s) Inhaler 2 Puff(s) Inhalation two times a day  carvedilol 6.25 milliGRAM(s) Oral every 12 hours  clopidogrel Tablet 75 milliGRAM(s) Oral daily  dextrose 5%. 1000 milliLiter(s) (50 mL/Hr) IV Continuous <Continuous>  dextrose 5%. 1000 milliLiter(s) (100 mL/Hr) IV Continuous <Continuous>  dextrose 50% Injectable 25 Gram(s) IV Push once  dextrose 50% Injectable 12.5 Gram(s) IV Push once  dextrose 50% Injectable 25 Gram(s) IV Push once  finasteride 5 milliGRAM(s) Oral daily  glucagon  Injectable 1 milliGRAM(s) IntraMuscular once  heparin  Infusion.  Unit(s)/Hr (10 mL/Hr) IV Continuous <Continuous>  insulin lispro (ADMELOG) corrective regimen sliding scale   SubCutaneous every 6 hours  pantoprazole    Tablet 40 milliGRAM(s) Oral before breakfast  tamsulosin 0.4 milliGRAM(s) Oral at bedtime    MEDICATIONS  (PRN):  dextrose Oral Gel 15 Gram(s) Oral once PRN Blood Glucose LESS THAN 70 milliGRAM(s)/deciliter  heparin   Injectable 6000 Unit(s) IV Push every 6 hours PRN For aPTT less than 40      PHYSICAL EXAM:    I&O's Summary  ICU Vital Signs Last 24 Hrs  T(C): 36.6 (04 Oct 2022 09:51), Max: 36.7 (03 Oct 2022 21:20)  T(F): 97.8 (04 Oct 2022 09:51), Max: 98.1 (03 Oct 2022 21:20)  HR: 58 (04 Oct 2022 09:51) (58 - 97)  BP: 137/81 (04 Oct 2022 09:51) (117/77 - 137/81)  BP(mean): --  ABP: --  ABP(mean): --  RR: 17 (04 Oct 2022 09:51) (16 - 18)  SpO2: 96% (04 Oct 2022 09:51) (96% - 100%)    O2 Parameters below as of 04 Oct 2022 09:51  Patient On (Oxygen Delivery Method): room air    GENERAL: NAD, well-developed  HEAD:  Atraumatic, Normocephalic  EYES: EOMI, conjunctiva and sclera clear  NECK: Supple, No JVD  CHEST/LUNG: Clear to auscultation bilaterally; No wheeze  HEART: Regular rate and rhythm; No murmurs, rubs, or gallops  ABDOMEN: Soft, Nontender, Nondistended; Bowel sounds present  EXTREMITIES:  2+ Peripheral Pulses, No clubbing, cyanosis, or edema  PSYCH: AAOx3  NEUROLOGY: non-focal  SKIN: No rashes or lesions    LABS:  CAPILLARY BLOOD GLUCOSE      POCT Blood Glucose.: 137 mg/dL (04 Oct 2022 12:20)  POCT Blood Glucose.: 144 mg/dL (04 Oct 2022 11:19)  POCT Blood Glucose.: 132 mg/dL (04 Oct 2022 07:31)                          14.1   9.00  )-----------( 272      ( 04 Oct 2022 01:55 )             45.5     10-04    135  |  99  |  14  ----------------------------<  132<H>  4.2   |  26  |  0.80    Ca    9.1      04 Oct 2022 02:20  Phos  3.8     10-04  Mg     1.80     10-04    TPro  6.9  /  Alb  4.3  /  TBili  0.4  /  DBili  x   /  AST  19  /  ALT  19  /  AlkPhos  42  10-03    PT/INR - ( 04 Oct 2022 08:55 )   PT: 12.6 sec;   INR: 1.09 ratio         PTT - ( 04 Oct 2022 08:55 )  PTT:59.5 sec  CARDIAC MARKERS ( 04 Oct 2022 02:20 )  x     / x     / 86 U/L / x     / 2.9 ng/mL          RADIOLOGY & ADDITIONAL TESTS:    Imaging Personally Reviewed:    Consultant(s) Notes Reviewed:      Care Discussed with Consultants/Other Providers:

## 2022-10-04 NOTE — PROGRESS NOTE ADULT - ASSESSMENT
78 yo man, speaks both English and Egyptian, with history of HTN, HLD, type 2 DM (not on insulin), BPH, ?COPD (PFTs in 2020 with moderate obstructive lung defect), and tobacco smoking (quit ~25 years ago) presents with 2 weeks of chest pain, admitted with concern for ACS/unstable angina, currently on heparin gtt.

## 2022-10-04 NOTE — PROGRESS NOTE ADULT - PROBLEM SELECTOR PLAN 7
Pt appears to be on tamsulosin and finasteride at home. Pt not sure of home meds. No S/S of urinary retention at this time.  - C/w tamsulosin 0.4 mg qHS and finasteride 5 mg daily

## 2022-10-04 NOTE — DISCHARGE NOTE PROVIDER - CARE PROVIDER_API CALL
Carlos A Rushing (MD)  Cardiovascular Disease; Internal Medicine  [address] [place] [address], [address] [place] [address]  Phone: [phone]  Fax: [phone]  Follow Up Time:

## 2022-10-04 NOTE — PROGRESS NOTE ADULT - PROBLEM SELECTOR PLAN 4
BPs in acceptable range on admission. Pt appears to be on losartan-HCTZ, PO Lasix, and carvedilol at home. Pt not sure of home meds.  - C/w losartan 100 mg daily and carvedilol 6.25 md BID  - Hold HCTZ and PO Lasix for now given NPO status and planned cardiac cath   - Monitor VS q4hrs for now

## 2022-10-04 NOTE — PROGRESS NOTE ADULT - PROBLEM SELECTOR PLAN 1
Pt with 2 weeks of constant left-sided, nonradiating, pressure-like chest pain, though with waxing and waning intensity. Concerning for ACS/unstable angina.   EKG without any acute ischemic changes.   Hs-trop currently flat at 13 -> 12.   Low suspicion for PE at this time, as D-dimer 343 (wnl when age adjusted) and pt with no shortness of breath at rest, hypoxia, tachycardia, or tachypnea.     -S/p  mg and Plavix 600 mg load.   -C/w ASA 81 mg daily and c/w home Plavix 75 mg daily for now  -C/w heparin gtt for now.   - TTE ordered  - Cardiology with plan for cardiac cath on Today  - Monitor for worsening chest pain and obtain serial EKGs and cardiac enzymes as needed  - Monitor on tele  - C/w Coreg and atorvastatin (therapeutic interchange for rosuvastatin)  -will continue to appreciate cardiology recs

## 2022-10-04 NOTE — DISCHARGE NOTE NURSING/CASE MANAGEMENT/SOCIAL WORK - PATIENT PORTAL LINK FT
You can access the FollowMyHealth Patient Portal offered by [facility] by registering at the following website: [email or web address]. By joining [facility]’s FollowMyHealth portal, you will also be able to view your health information using other applications (apps) compatible with our system.

## 2022-10-04 NOTE — PROGRESS NOTE ADULT - PROBLEM SELECTOR PLAN 6
Pt with chronic dyspnea on exertion, has been present for several years, denies any recent worsening. Per review of outpatient records, pt had PFTs in 2020 that showed a moderate obstructive lung defect). According to Surescripts, pt appears to be on Breo Ellipta at home.  - C/w Symbicort BID (therapeutic interchange for Breo Ellipta)  - Supplemental O2 PRN

## 2022-10-04 NOTE — DISCHARGE NOTE PROVIDER - HOSPITAL COURSE
Chest pain.   -Concerning for ACS/unstable angina.  -EKG without any acute ischemic changes. Hs-trop currently flat at 13 -> 12.  - Cardiology consult obtained  - S/p  mg and Plavix 600 mg load. Will start ASA 81 mg daily and c/w home Plavix 75 mg daily as per Cardiology recs.  - Heparin gtt started in ED as per Cardiology recs. C/w heparin gtt for now.   - TTE ordered  - Cardiology with plan for cardiac cath on Tuesday  - Monitor for worsening chest pain and obtain serial EKGs and cardiac enzymes as needed  - Monitor on tele  - C/w Coreg and atorvastatin (therapeutic interchange for rosuvastatin).  - 10/4 LHC: ostial RCA 50-60%. LM okay. LAD ok. LCx ok. RRA access.    Leukocytosis.   -WBC 12.56 on admission. Unclear etiology, possibly reactive in setting of ACS. Pt afebrile, with no systemic or localizing S/S of infection.   - Trend WBC with CBC daily  - Monitor off abx at this time  - Pt denies any dysuria or other urinary symptoms, so will hold off on checking UA.    Type 2 diabetes mellitus treated without insulin.   -A1c 6.8% in 5/2021 on review of outpatient records. Pt appears to be on Trulicity, Janumet, and ?Farxiga at home.    HTN (hypertension).   -BPs in acceptable range on admission. Pt appears to be on losartan-HCTZ, PO Lasix, and carvedilol at home. Pt not sure of home meds.  - C/w losartan 100 mg daily and carvedilol 6.25 md BID  - Hold HCTZ and PO Lasix for now given NPO status and cardiac cath on Tuesday with contrast load  - Monitor VS q4hrs for now    HLD (hyperlipidemia).   -Pt appears to be on rosuvastatin at home. Pt not sure of home meds.  - C/w atorvastatin 80 mg qHS (therapeutic interchange for rosuvastatin 20 mg qHS)    Dyspnea on exertion.   -Pt with chronic dyspnea on exertion, has been present for several years, denies any recent worsening. Per review of outpatient records, pt had PFTs in 2020 that showed a moderate obstructive lung defect). According to Surescripts, pt appears to be on Breo Ellipta at home.  - C/w Symbicort BID (therapeutic interchange for Breo Ellipta)  - Supplemental O2 PRN    BPH (benign prostatic hyperplasia).   -Pt appears to be on tamsulosin and finasteride at home. Pt not sure of home meds. No S/S of urinary retention at this time.  - C/w tamsulosin 0.4 mg qHS and finasteride 5 mg daily    Need for prophylactic measure.   - DVT ppx: On heparin gtt    DIspo: AL home   Chest pain.   -Concerning for ACS/unstable angina.  -EKG without any acute ischemic changes. Hs-trop currently flat at 13 -> 12.  - Cardiology consult obtained  - S/p  mg and Plavix 600 mg load. Will start ASA 81 mg daily and c/w home Plavix 75 mg daily as per Cardiology recs.  - Heparin gtt started in ED as per Cardiology recs. C/w heparin gtt for now.   - Monitor on tele  - C/w Coreg and atorvastatin (therapeutic interchange for rosuvastatin).  - 10/4 LHC: ostial RCA 50-60%. LM okay. LAD ok. LCx ok. RRA access.    Leukocytosis.   -WBC 12.56 on admission. Unclear etiology, possibly reactive in setting of ACS. Pt afebrile, with no systemic or localizing S/S of infection.   - Trend WBC with CBC daily  - Monitor off abx at this time  - Pt denies any dysuria or other urinary symptoms, so will hold off on checking UA.    Type 2 diabetes mellitus treated without insulin.   -A1c 6.8% in 5/2021 on review of outpatient records. Pt appears to be on Trulicity, Janumet, and ?Farxiga at home.    HTN (hypertension).   -BPs in acceptable range on admission. Pt appears to be on losartan-HCTZ, PO Lasix, and carvedilol at home. Pt not sure of home meds.  - C/w losartan 100 mg daily and carvedilol 6.25 md BID  - Hold HCTZ and PO Lasix for now given NPO status and cardiac cath on Tuesday with contrast load  - Monitor VS q4hrs for now    HLD (hyperlipidemia).   -Pt appears to be on rosuvastatin at home. Pt not sure of home meds.  - C/w atorvastatin 80 mg qHS (therapeutic interchange for rosuvastatin 20 mg qHS)    Dyspnea on exertion.   -Pt with chronic dyspnea on exertion, has been present for several years, denies any recent worsening. Per review of outpatient records, pt had PFTs in 2020 that showed a moderate obstructive lung defect). According to Surescripts, pt appears to be on Breo Ellipta at home.  - C/w Symbicort BID (therapeutic interchange for Breo Ellipta)  - Supplemental O2 PRN    BPH (benign prostatic hyperplasia).   -Pt appears to be on tamsulosin and finasteride at home. Pt not sure of home meds. No S/S of urinary retention at this time.  - C/w tamsulosin 0.4 mg qHS and finasteride 5 mg daily    DIspo: cherelle home

## 2022-10-04 NOTE — PROGRESS NOTE ADULT - PROBLEM SELECTOR PLAN 2
WBC 12.56 on admission. Unclear etiology, possibly reactive in setting of ACS. Pt afebrile, with no systemic or localizing S/S of infection.     Now resolved on repeat labs

## 2022-10-04 NOTE — PROGRESS NOTE ADULT - PROBLEM SELECTOR PLAN 3
A1c 7.3  Pt appears to be on Trulicity, Janumet, and Farxiga at home. Pt not sure of home meds.     - Hold Trulicity and oral hypoglycemic agents while inpatient  - Start low-dose ISS and FS checks q6hrs while NPO, qAC TID and qHS if on PO diet  - Complete Med Rec in AM

## 2022-10-04 NOTE — CHART NOTE - NSCHARTNOTEFT_GEN_A_CORE
Patient arrives in [facility] for cardiac catheterization with his daughter Marjorie. Pt is able to converse in English but would like his daughter to assist in translation. Consent was obtained in Iranian at his request with daughter assisting in translation.

## 2022-10-04 NOTE — ED ADULT NURSE REASSESSMENT NOTE - NS ED NURSE REASSESS COMMENT FT1
Aptt results are 50.9. Increased rate to 1200 units/hr (12 mL/hr) as per ACS Heparin nomogram. NSR on bedside cardiac monitor. Respirations even and unlabored. Pt speaking in full and complete sentences. Bed in lowest position, call bell in reach, wheels locked, side rails up, safety maintained. Awaiting further orders.
patient aaox4. ambulatory. came in with chest pain. patient denies chest pain, palpitations, shortness of breath, dizziness, weakness, lightheadedness. cardiac monitor nsr. respirations even and unlabored on room air. heparin at 12ml/hr. Will continue to monitor
Report received from day RNEli. A&Ox4 and ambulatory with steady gait. Denies CP, SOB, nausea, vomiting, headache, fever or chills. Respirations even and unlabored. Pt speaking in full and complete sentences. Heparin running at 10 mL/hr at change of shift as per order. NSR on bedside cardiac monitor. Bed in lowest position, wheels locked, side rails up, safety maintained. Awaiting further orders.

## 2022-10-06 PROBLEM — E11.9 TYPE 2 DIABETES MELLITUS WITHOUT COMPLICATIONS: Chronic | Status: ACTIVE | Noted: 2022-10-03

## 2022-10-06 PROBLEM — N40.0 BENIGN PROSTATIC HYPERPLASIA WITHOUT LOWER URINARY TRACT SYMPTOMS: Chronic | Status: ACTIVE | Noted: 2022-10-04

## 2022-10-06 PROBLEM — J44.9 CHRONIC OBSTRUCTIVE PULMONARY DISEASE, UNSPECIFIED: Chronic | Status: ACTIVE | Noted: 2022-10-04

## 2022-10-06 PROBLEM — I10 ESSENTIAL (PRIMARY) HYPERTENSION: Chronic | Status: ACTIVE | Noted: 2022-10-03

## 2022-10-06 PROBLEM — E78.5 HYPERLIPIDEMIA, UNSPECIFIED: Chronic | Status: ACTIVE | Noted: 2022-10-03

## 2022-10-24 ENCOUNTER — NON-APPOINTMENT (OUTPATIENT)
Age: 77
End: 2022-10-24

## 2022-10-24 ENCOUNTER — APPOINTMENT (OUTPATIENT)
Dept: CARDIOLOGY | Facility: CLINIC | Age: 77
End: 2022-10-24

## 2022-10-24 VITALS
HEART RATE: 58 BPM | BODY MASS INDEX: 34.86 KG/M2 | TEMPERATURE: 97.8 F | HEIGHT: 68 IN | WEIGHT: 230 LBS | DIASTOLIC BLOOD PRESSURE: 88 MMHG | OXYGEN SATURATION: 97 % | SYSTOLIC BLOOD PRESSURE: 139 MMHG

## 2022-10-24 PROCEDURE — 93000 ELECTROCARDIOGRAM COMPLETE: CPT

## 2022-10-24 PROCEDURE — 99214 OFFICE O/P EST MOD 30 MIN: CPT

## 2022-10-24 RX ORDER — ASPIRIN ENTERIC COATED TABLETS 81 MG 81 MG/1
81 TABLET, DELAYED RELEASE ORAL DAILY
Qty: 90 | Refills: 1 | Status: ACTIVE | COMMUNITY
Start: 2022-10-24

## 2022-10-24 RX ORDER — CLOPIDOGREL 75 MG/1
75 TABLET, FILM COATED ORAL DAILY
Refills: 0 | Status: DISCONTINUED | COMMUNITY
End: 2022-10-24

## 2022-10-24 NOTE — REVIEW OF SYSTEMS
[Dyspnea on exertion] : dyspnea during exertion [Negative] : Heme/Lymph [Chest Discomfort] : no chest discomfort [Lower Ext Edema] : no extremity edema [Orthopnea] : no orthopnea [PND] : no PND [Syncope] : no syncope

## 2022-10-24 NOTE — PHYSICAL EXAM
[Well Developed] : well developed [Well Nourished] : well nourished [No Acute Distress] : no acute distress [Obese] : obese [Normal Conjunctiva] : normal conjunctiva [Normal Venous Pressure] : normal venous pressure [No Carotid Bruit] : no carotid bruit [Normal S1, S2] : normal S1, S2 [No Murmur] : no murmur [No Rub] : no rub [No Gallop] : no gallop [Clear Lung Fields] : clear lung fields [Good Air Entry] : good air entry [No Respiratory Distress] : no respiratory distress  [Soft] : abdomen soft [Non Tender] : non-tender [No Masses/organomegaly] : no masses/organomegaly [Normal Bowel Sounds] : normal bowel sounds [Normal Gait] : normal gait [No Cyanosis] : no cyanosis [No Edema] : no edema [No Clubbing] : no clubbing [No Varicosities] : no varicosities [No Rash] : no rash [No Skin Lesions] : no skin lesions [Moves all extremities] : moves all extremities [No Focal Deficits] : no focal deficits [Normal Speech] : normal speech [Alert and Oriented] : alert and oriented [Normal memory] : normal memory [de-identified] : overweight

## 2022-10-24 NOTE — HISTORY OF PRESENT ILLNESS
[FreeTextEntry1] : 76 yo man presents for followup of shortness of breath / chest pain. Son is Harris (tel: [phone]); daughter is Marjorie (tel: [phone]). \par \par H/o DM (on oral medications; no known complications of DM), HLD, HTN (long-standing), obesity; former smoker, stopped many years ago. No h/o known heart disease, MI, heart failure, PVD, stroke. No bleeding issues. He had COVID.\par \par CV data:\par Echo: minimal MR, mild aortic root dilation, calcified aortic valve with normal opening, normal LA, grossly normal LVEF, no peric effusion\par Stress test: Regadenoson; no chest pain or EKG changes; scan shows a small, mild defect in the mid to distal inferolateral wall (fixed) c/w infarct; LVEF 60% with no segmental wall motion abnormality\par CXR ([facility]) in Oct 2020: no pneumonia; bibasilar atelectasis/scarring; small hiatal hernia\par \par At his last visit in October 2022, the following issues were discussed:\par Chest pain (786.50) (R07.9)\par  · H/o multiple CV risk factors and an abnormal stress test in the past (no cath in the past).\par     Now, a two week h/o wax/waning chest pressure, left side. EKG unchanged. Long\par     discussion with patient and his daughter. I suggest he be admitted via the ED for\par     further evaluation of his chest pain, with a likely cardiac cath during this admission. He is\par     in agreement with this plan. All questions answered.\par Diabetes (250.00) (E11.9)\par  · Continue with med rx and diet to address his DM. He understands the need for\par     improvement in his diet with an eye towards weight loss. States last A1c ~7%.\par Hyperlipemia (272.4) (E78.5)\par  · Continue statin therapy.\par Hypertension (401.9) (I10)\par  · Continue present med rx for BP.\par Shortness of breath (786.05) (R06.02)\par  · Chronic CAMACHO; stable. Echo in past showed normal valve and cardiac function.\par \par Thus, he was admitted to the hospital. No MI detected. Cardiac catheterization showed only mild atherosclerosis (max stenosis was 40% ostial RCA); no obstructive CAD. Since his hospitalization, he has not been taking Plavix. He is not on aspirin. He is presently taking 50/12.5 of losartan/HCTZ; at a higher dose, he noted dizziness. No chest discomfort; prior symptoms have abated. Some CAMACHO, not new. \par \par \par \par \par EKG today, compared to prior:\par Sinus bradycardia  \par First degree A-V block \par Leftward axis\par QRS prolongation; IVCD\par ABNORMAL ECG\par

## 2022-11-05 NOTE — HISTORY OF PRESENT ILLNESS
[FreeTextEntry1] : 76 yo man presents for followup of shortness of breath / chest pain. Son is Harris (tel: [phone]); daughter is Marjorie (tel: [phone]). \par \par H/o DM (on oral medications; no known complications of DM), HLD, HTN (long-standing), obesity; former smoker, stopped many years ago. No h/o known heart disease, MI, heart failure, PVD, stroke. No bleeding issues. He had COVID.\par \par CV data:\par Echo: minimal MR, mild aortic root dilation, calcified aortic valve with normal opening, normal LA, grossly normal LVEF, no peric effusion\par Stress test: Regadenoson; no chest pain or EKG changes; scan shows a small, mild defect in the mid to distal inferolateral wall (fixed) c/w infarct; LVEF 60% with no segmental wall motion abnormality\par CXR ([facility]) in Oct 2020: no pneumonia; bibasilar atelectasis/scarring; small hiatal hernia\par \par In early October 2022, he was admitted to the hospital with chest pain. No MI detected. Cardiac catheterization showed only mild atherosclerosis (max stenosis was 40% ostial RCA); no obstructive CAD.\par \par At his last visit in October 24, 2022, the following issues were discussed:\par Chest pain (786.50) (R07.9)\par  · H/o multiple CV risk factors and an abnormal stress test in the past. At last visit, noted\par     chest pain; cardiac cath showed only mild atherosclerosis; no PCI needed. No further\par     chest pain. Some mild fullness in stomach/chest, non-exertional and non-cardiac in\par     origin. For now, continue with preventive therapy.\par Diabetes (250.00) (E11.9)\par  · Continue with med rx and diet to address his DM. He understands the need for\par     improvement in his diet with an eye towards weight loss. States last A1c ~7%. He has\par     stopped taking Plavix; should start low dose aspirin.\par Hyperlipemia (272.4) (E78.5)\par  · Continue statin therapy.\par Hypertension (401.9) (I10)\par  · Continue present med rx for BP, including coreg and losartan/HCTZ at present doses.\par     Continue with home BP monitoring (has been under reasonable control).\par Shortness of breath (786.05) (R06.02)\par  · Chronic CAMACHO; stable. Echo in past showed normal valve and cardiac function. No\par     significant CAD. Weight loss recommended.\par \par Since his last visit, \par \par \par \par EKG today, compared to prior:\par

## 2022-11-10 ENCOUNTER — APPOINTMENT (OUTPATIENT)
Dept: CARDIOLOGY | Facility: CLINIC | Age: 77
End: 2022-11-10

## 2022-12-27 NOTE — PROGRESS NOTE ADULT - PROBLEM SELECTOR PLAN 5
70
Pt appears to be on rosuvastatin at home. Pt not sure of home meds.  - C/w atorvastatin 80 mg qHS (therapeutic interchange for rosuvastatin 20 mg qHS)

## 2023-03-21 ENCOUNTER — RX RENEWAL (OUTPATIENT)
Age: 78
End: 2023-03-21

## 2023-06-10 NOTE — ED PROVIDER NOTE - NSICDXPASTMEDICALHX_GEN_ALL_CORE_FT
184
PAST MEDICAL HISTORY:  DM2 (diabetes mellitus, type 2)     HLD (hyperlipidemia)     HTN (hypertension)

## 2023-07-24 ENCOUNTER — APPOINTMENT (OUTPATIENT)
Dept: CARDIOLOGY | Facility: CLINIC | Age: 78
End: 2023-07-24
Payer: MEDICARE

## 2023-07-24 ENCOUNTER — NON-APPOINTMENT (OUTPATIENT)
Age: 78
End: 2023-07-24

## 2023-07-24 VITALS — HEART RATE: 71 BPM | OXYGEN SATURATION: 93 % | SYSTOLIC BLOOD PRESSURE: 122 MMHG | DIASTOLIC BLOOD PRESSURE: 84 MMHG

## 2023-07-24 VITALS — WEIGHT: 225 LBS | BODY MASS INDEX: 34.1 KG/M2 | HEIGHT: 68 IN

## 2023-07-24 DIAGNOSIS — E11.9 TYPE 2 DIABETES MELLITUS W/OUT COMPLICATIONS: ICD-10-CM

## 2023-07-24 DIAGNOSIS — E78.5 HYPERLIPIDEMIA, UNSPECIFIED: ICD-10-CM

## 2023-07-24 DIAGNOSIS — R06.02 SHORTNESS OF BREATH: ICD-10-CM

## 2023-07-24 DIAGNOSIS — I10 ESSENTIAL (PRIMARY) HYPERTENSION: ICD-10-CM

## 2023-07-24 DIAGNOSIS — R07.9 CHEST PAIN, UNSPECIFIED: ICD-10-CM

## 2023-07-24 PROCEDURE — 99214 OFFICE O/P EST MOD 30 MIN: CPT

## 2023-07-24 PROCEDURE — 93000 ELECTROCARDIOGRAM COMPLETE: CPT

## 2023-07-24 RX ORDER — LOSARTAN POTASSIUM AND HYDROCHLOROTHIAZIDE 12.5; 5 MG/1; MG/1
50-12.5 TABLET ORAL TWICE DAILY
Qty: 60 | Refills: 3 | Status: ACTIVE | COMMUNITY
Start: 2021-01-04 | End: 1900-01-01

## 2023-07-24 RX ORDER — LOSARTAN POTASSIUM AND HYDROCHLOROTHIAZIDE 25; 100 MG/1; MG/1
100-25 TABLET ORAL
Qty: 90 | Refills: 3 | Status: DISCONTINUED | COMMUNITY
Start: 2023-03-21 | End: 2023-07-24

## 2023-07-24 NOTE — PHYSICAL EXAM
[Well Developed] : well developed [Well Nourished] : well nourished [No Acute Distress] : no acute distress [Obese] : obese [Normal Conjunctiva] : normal conjunctiva [Normal Venous Pressure] : normal venous pressure [No Carotid Bruit] : no carotid bruit [Normal S1, S2] : normal S1, S2 [No Murmur] : no murmur [No Rub] : no rub [No Gallop] : no gallop [Clear Lung Fields] : clear lung fields [Good Air Entry] : good air entry [No Respiratory Distress] : no respiratory distress  [Soft] : abdomen soft [Non Tender] : non-tender [No Masses/organomegaly] : no masses/organomegaly [Normal Bowel Sounds] : normal bowel sounds [Normal Gait] : normal gait [No Edema] : no edema [No Cyanosis] : no cyanosis [No Clubbing] : no clubbing [No Varicosities] : no varicosities [No Rash] : no rash [No Skin Lesions] : no skin lesions [Moves all extremities] : moves all extremities [No Focal Deficits] : no focal deficits [Normal Speech] : normal speech [Alert and Oriented] : alert and oriented [Normal memory] : normal memory [de-identified] : overweight

## 2023-07-24 NOTE — HISTORY OF PRESENT ILLNESS
[FreeTextEntry1] : 77 yo man presents for followup of shortness of breath / chest pain. Son is Harris (tel: [phone]); daughter is Marjorie (tel: [phone]). \par \par H/o DM (on oral medications; no known complications of DM), HLD, HTN (long-standing), obesity; former smoker, stopped many years ago. No h/o known heart disease, MI, heart failure, PVD, stroke. No bleeding issues. He had COVID.\par \par CV data:\par Echo: minimal MR, mild aortic root dilation, calcified aortic valve with normal opening, normal LA, grossly normal LVEF, no peric effusion\par Stress test: Regadenoson; no chest pain or EKG changes; scan shows a small, mild defect in the mid to distal inferolateral wall (fixed) c/w infarct; LVEF 60% with no segmental wall motion abnormality\par CXR ([facility]) in Oct 2020: no pneumonia; bibasilar atelectasis/scarring; small hiatal hernia\par \par In early October 2022, he was admitted to the hospital with chest pain. No MI detected. Cardiac catheterization showed only mild atherosclerosis (max stenosis was 40% ostial RCA); no obstructive CAD.\par \par At his last visit in October 24, 2022, the following issues were discussed:\par Chest pain (786.50) (R07.9)\par  · H/o multiple CV risk factors and an abnormal stress test in the past. At last visit, noted\par     chest pain; cardiac cath showed only mild atherosclerosis; no PCI needed. No further\par     chest pain. Some mild fullness in stomach/chest, non-exertional and non-cardiac in\par     origin. For now, continue with preventive therapy.\par Diabetes (250.00) (E11.9)\par  · Continue with med rx and diet to address his DM. He understands the need for\par     improvement in his diet with an eye towards weight loss. States last A1c ~7%. He has\par     stopped taking Plavix; should start low dose aspirin.\par Hyperlipemia (272.4) (E78.5)\par  · Continue statin therapy.\par Hypertension (401.9) (I10)\par  · Continue present med rx for BP, including coreg and losartan/HCTZ at present doses.\par     Continue with home BP monitoring (has been under reasonable control).\par Shortness of breath (786.05) (R06.02)\par  · Chronic CAMACHO; stable. Echo in past showed normal valve and cardiac function. No\par     significant CAD. Weight loss recommended.\par \par Since last visit, no significant changes. Some CAMACHO. At times, feels a bit dizzy after taking all of his am medications at once, with concomitant hypotension. No angina. No edema.  He has started to break in half his losartan to take it BID, with improved symptoms. \par \par EKG today:\par Sinus rhythm \par First degree A-V block \par LAFB\par IVCD\par ABNORMAL ECG\par

## 2024-04-30 ENCOUNTER — EMERGENCY (EMERGENCY)
Facility: HOSPITAL | Age: 79
LOS: 1 days | Discharge: HOME | End: 2024-04-30
Attending: EMERGENCY MEDICINE
Payer: MEDICARE

## 2024-04-30 VITALS
HEIGHT: 68 IN | HEART RATE: 98 BPM | SYSTOLIC BLOOD PRESSURE: 137 MMHG | DIASTOLIC BLOOD PRESSURE: 85 MMHG | WEIGHT: 214.95 LBS | RESPIRATION RATE: 17 BRPM | OXYGEN SATURATION: 95 % | TEMPERATURE: 98 F

## 2024-04-30 PROCEDURE — 99283 EMERGENCY DEPT VISIT LOW MDM: CPT | Mod: 25

## 2024-04-30 PROCEDURE — 73140 X-RAY EXAM OF FINGER(S): CPT | Mod: 26,LT

## 2024-04-30 PROCEDURE — 26750 TREAT FINGER FRACTURE EACH: CPT | Mod: 54,F3

## 2024-04-30 PROCEDURE — 73140 X-RAY EXAM OF FINGER(S): CPT

## 2024-04-30 PROCEDURE — 99284 EMERGENCY DEPT VISIT MOD MDM: CPT | Mod: FS,57

## 2024-04-30 NOTE — ED PROVIDER NOTE - NSFOLLOWUPINSTRUCTIONS_ED_ALL_ED_FT
Follow-up with a hand orthopedist within 1 week.  You can take Tylenol as needed for pain.  You can removed the splint temporarily when you shower but otherwise keep the splint at all times until you follow-up with the specialist.  If you experience any new or worsening symptoms or if you are concerned you can always come back to the emergency for a re-evaluation.  Some results may not be available at the time of your discharge from the hospital. You can download the FOLLOW MY HEALTH ajay and have access to these results.  **Official radiology report by the radiologist will be available within 24 hours. If there is a discrepancy you will contacted.

## 2024-04-30 NOTE — ED PROCEDURE NOTE - CPROC ED POST PROC CARE GUIDE1
[id number]          Verbal/written post procedure instructions were given to patient/caregiver./Instructed patient/caregiver to follow-up with primary care physician./Instructed patient/caregiver regarding signs and symptoms of infection./Elevate the injured extremity as instructed./Keep the cast/splint/dressing clean and dry.

## 2024-04-30 NOTE — ED PROVIDER NOTE - OBJECTIVE STATEMENT
79-year-old male, history of diabetes, hypertension, HLD, on aspirin.  Presents for evaluation of left middle finger injury 4 days ago.  Door closed on the finger.  Since then developed some swelling and bruising under the nail.  Now reports swelling around the nail.  Pain gradually improving.  Denies any numbness, tingling or focal weakness.  No skin break or bleeding initially.

## 2024-04-30 NOTE — ED ADULT NURSE NOTE - NSFALLUNIVINTERV_ED_ALL_ED
Bed/Stretcher in lowest position, wheels locked, appropriate side rails in place/Call bell, personal items and telephone in reach/Instruct patient to call for assistance before getting out of bed/chair/stretcher/Non-slip footwear applied when patient is off stretcher/[place] to call system/Physically safe environment - no spills, clutter or unnecessary equipment/Purposeful proactive rounding/Room/bathroom lighting operational, light cord in reach

## 2024-04-30 NOTE — ED PROVIDER NOTE - CLINICAL SUMMARY MEDICAL DECISION MAKING FREE TEXT BOX
79-year-old male, presents for evaluation of a left middle finger pain status post injury 4 days ago.  Neurovascular intact.  Pain rated at 2/10.  X-ray shows nondisplaced tuft fracture.  No indication for trephination at this time.  Aluminum finger splint applied.  No signs of paronychia or infection.  Will refer to outpatient Ortho hand follow-up within 1 week.

## 2024-04-30 NOTE — ED PROVIDER NOTE - PHYSICAL EXAMINATION
left middle finger full range of motion.  90% subungual hematoma.  Mild nail elevation at the eponychial area.  No sensory deficit.  Cap refill less than 2 seconds.  No erythema, induration or increased warmth.  No streaking.
Never smoker

## 2024-04-30 NOTE — ED PROVIDER NOTE - PATIENT PORTAL LINK FT
You can access the FollowMyHealth Patient Portal offered by [facility] by registering at the following website: [email or web address]. By joining [employer]’s FollowMyHealth portal, you will also be able to view your health information using other applications (apps) compatible with our system.

## 2024-05-16 ENCOUNTER — EMERGENCY (EMERGENCY)
Facility: HOSPITAL | Age: 79
LOS: 1 days | Discharge: HOME | End: 2024-05-16
Attending: EMERGENCY MEDICINE
Payer: MEDICARE

## 2024-05-16 VITALS
HEART RATE: 78 BPM | TEMPERATURE: 98 F | WEIGHT: 225.09 LBS | DIASTOLIC BLOOD PRESSURE: 81 MMHG | RESPIRATION RATE: 18 BRPM | OXYGEN SATURATION: 98 % | SYSTOLIC BLOOD PRESSURE: 123 MMHG | HEIGHT: 68 IN

## 2024-05-16 PROCEDURE — 99282 EMERGENCY DEPT VISIT SF MDM: CPT

## 2024-05-16 PROCEDURE — 99283 EMERGENCY DEPT VISIT LOW MDM: CPT | Mod: FS

## 2024-05-16 NOTE — ED PROVIDER NOTE - ATTENDING APP SHARED VISIT CONTRIBUTION OF CARE
I was physically present for the E/M service provided. I agree with above history, physical, and plan which I have reviewed and edited where appropriate. I was physically present for the key portions of the service provided.    Mcleod: finger injury- hasn't follow up and received missed calls from hospital so came here to see what was needed to be done. admits pain improving. no sensory loss.. nailbed subungal hematoma improving. moving digit. no erythema, no pus.    finger injury- referral list to hand/ortho given. continue with care.

## 2024-05-16 NOTE — ED ADULT TRIAGE NOTE - HEIGHT IN INCHES
8 Ftsg Text: The defect edges were debeveled with a #15 scalpel blade.  Given the location of the defect, shape of the defect and the proximity to free margins a full thickness skin graft was deemed most appropriate.  Using a sterile surgical marker, the primary defect shape was transferred to the donor site. The area thus outlined was incised deep to adipose tissue with a #15 scalpel blade.  The harvested graft was then trimmed of adipose tissue until only dermis and epidermis was left.  The skin margins of the secondary defect were undermined to an appropriate distance in all directions utilizing iris scissors.  The secondary defect was closed with interrupted buried subcutaneous sutures.  The skin edges were then re-apposed with running  sutures.  The skin graft was then placed in the primary defect and oriented appropriately.

## 2024-05-16 NOTE — ED PROVIDER NOTE - NSFOLLOWUPINSTRUCTIONS_ED_ALL_ED_FT
1) Follow up with the hand doctor as discussed, you may receive a phone call to help coordinate this appointment, do not delete the voicemails as this is the referrals coordinator trying to help get you an appointment.   2) Return to the ED immediately for new or worsening symptoms   3) Please continue to take any home medications as prescribed  4) Continue to wear your splint until you see the hand doctor

## 2024-05-16 NOTE — ED PROVIDER NOTE - CARE PROVIDERS DIRECT ADDRESSES
,DirectAddress_Unknown,[email or web address]

## 2024-05-16 NOTE — ED ADULT NURSE NOTE - NS ED NOTE  TALK SOMEONE YN
Pt had cardiac testing. Dr. Alfred sent a note/message to Dr. Ngo regarding vascular surgery.    Pt was to have surgery originally for cancer but Dr. Alfred ordered cardiac testing.    Is pt cleared for radical nephrectomy,left?    Please advise. Thanks.   No

## 2024-05-16 NOTE — ED PROVIDER NOTE - CARE PROVIDER_API CALL
Harris Bello  Surgery  [address] [place] [address]  Phone: [phone]  Fax: [phone]  Follow Up Time:     Faisal Cloud  Orthopaedic Surgery  [address] [place] [address]  Phone: [phone]  Fax: [phone]  Follow Up Time:

## 2024-05-16 NOTE — ED ADULT NURSE NOTE - NSFALLUNIVINTERV_ED_ALL_ED
Bed/Stretcher in lowest position, wheels locked, appropriate side rails in place/Call bell, personal items and telephone in reach/Instruct patient to call for assistance before getting out of bed/chair/stretcher/Non-slip footwear applied when patient is off stretcher/[place] to call system/Physically safe environment - no spills, clutter or unnecessary equipment/Purposeful proactive rounding/Room/bathroom lighting operational, light cord in reach

## 2024-05-16 NOTE — ED PROVIDER NOTE - PATIENT PORTAL LINK FT
You can access the FollowMyHealth Patient Portal offered by [facility] by registering at the following website: [email or web address]. By joining [facility]’s FollowMyHealth portal, you will also be able to view your health information using other applications (apps) compatible with our system.

## 2024-05-16 NOTE — ED ADULT TRIAGE NOTE - CHIEF COMPLAINT QUOTE
as per pt I came here before I broke my finger left middle finger ,I'm still having pain in my left middle finger

## 2024-05-16 NOTE — ED PROVIDER NOTE - OBJECTIVE STATEMENT
79-year-old male PMH DM2, HTN, HLD, on ASA presenting to emergency department for evaluation of left third digit injury. patient states on April 26  he closed a door on his finger.  Presented to [facility] ED   on April 30 diagnosed with a nondisplaced comminuted distal tuft fracture placed in splint and told to follow-up with hand.  Patient states he has gotten phone calls regarding follow-up but was unsure if he had to return to the emergency department for repeat evaluation.  Pain has been improving.   denies numbness, difficulty ranging finger, fever, other complaint.

## 2024-07-22 ENCOUNTER — APPOINTMENT (OUTPATIENT)
Dept: CARDIOLOGY | Facility: CLINIC | Age: 79
End: 2024-07-22
Payer: MEDICARE

## 2024-07-22 ENCOUNTER — NON-APPOINTMENT (OUTPATIENT)
Age: 79
End: 2024-07-22

## 2024-07-22 VITALS — SYSTOLIC BLOOD PRESSURE: 149 MMHG | DIASTOLIC BLOOD PRESSURE: 98 MMHG

## 2024-07-22 VITALS
BODY MASS INDEX: 33.8 KG/M2 | DIASTOLIC BLOOD PRESSURE: 102 MMHG | OXYGEN SATURATION: 98 % | HEIGHT: 68 IN | HEART RATE: 73 BPM | WEIGHT: 223 LBS | SYSTOLIC BLOOD PRESSURE: 164 MMHG

## 2024-07-22 DIAGNOSIS — E78.5 HYPERLIPIDEMIA, UNSPECIFIED: ICD-10-CM

## 2024-07-22 DIAGNOSIS — E11.9 TYPE 2 DIABETES MELLITUS W/OUT COMPLICATIONS: ICD-10-CM

## 2024-07-22 DIAGNOSIS — I10 ESSENTIAL (PRIMARY) HYPERTENSION: ICD-10-CM

## 2024-07-22 DIAGNOSIS — R06.02 SHORTNESS OF BREATH: ICD-10-CM

## 2024-07-22 DIAGNOSIS — E66.9 OBESITY, UNSPECIFIED: ICD-10-CM

## 2024-07-22 DIAGNOSIS — R07.9 CHEST PAIN, UNSPECIFIED: ICD-10-CM

## 2024-07-22 PROCEDURE — 99214 OFFICE O/P EST MOD 30 MIN: CPT

## 2024-07-22 PROCEDURE — G0404: CPT

## 2024-07-22 PROCEDURE — G2211 COMPLEX E/M VISIT ADD ON: CPT

## 2024-07-22 NOTE — PHYSICAL EXAM
[Well Developed] : well developed [Well Nourished] : well nourished [No Acute Distress] : no acute distress [Obese] : obese [Normal Conjunctiva] : normal conjunctiva [Normal Venous Pressure] : normal venous pressure [No Carotid Bruit] : no carotid bruit [Normal S1, S2] : normal S1, S2 [No Murmur] : no murmur [No Rub] : no rub [No Gallop] : no gallop [Clear Lung Fields] : clear lung fields [Good Air Entry] : good air entry [No Respiratory Distress] : no respiratory distress  [Soft] : abdomen soft [Non Tender] : non-tender [No Masses/organomegaly] : no masses/organomegaly [Normal Bowel Sounds] : normal bowel sounds [Normal Gait] : normal gait [No Edema] : no edema [No Cyanosis] : no cyanosis [No Clubbing] : no clubbing [No Varicosities] : no varicosities [No Rash] : no rash [No Skin Lesions] : no skin lesions [Moves all extremities] : moves all extremities [No Focal Deficits] : no focal deficits [Normal Speech] : normal speech [Alert and Oriented] : alert and oriented [Normal memory] : normal memory [de-identified] : /100 mmHg bilaterally [de-identified] : overweight

## 2024-07-22 NOTE — HISTORY OF PRESENT ILLNESS
[FreeTextEntry1] : 78 yo man presents for followup of shortness of breath / chest pain. Son is Harris (tel: [phone]); daughter is Marjorie (tel: [phone]). Presents today with grandson.  H/o DM (on oral medications; no known complications of DM), HLD, HTN (long-standing), obesity; former smoker, stopped many years ago. No h/o known heart disease, MI, heart failure, PVD, stroke. No bleeding issues. He had COVID.  CV data: Echo: minimal MR, mild aortic root dilation, calcified aortic valve with normal opening, normal LA, grossly normal LVEF, no peric effusion Stress test: Regadenoson; no chest pain or EKG changes; scan shows a small, mild defect in the mid to distal inferolateral wall (fixed) c/w infarct; LVEF 60% with no segmental wall motion abnormality CXR ([facility]) in Oct 2020: no pneumonia; bibasilar atelectasis/scarring; small hiatal hernia  In early October 2022, he was admitted to the hospital with chest pain. No MI detected. Cardiac catheterization showed only mild atherosclerosis (max stenosis was 40% ostial RCA); no obstructive CAD.  At his last visit in July 2023, the following issues were discussed: Chest pain (786.50) (R07.9) H/o multiple CV risk factors and an abnormal stress test in the past. Cardiac cath     showed only mild atherosclerosis; no PCI needed. No further chest pain. Some mild     fullness in stomach/chest, non-exertional and non-cardiac in origin. For now, continue     with preventive therapy. No further CV testing indicated at this time. Diabetes (250.00) (E11.9) Continue with med rx and diet to address his DM. He understands the need for     improvement in his diet with an eye towards weight loss. Low dose aspirin to be     continued. Hyperlipemia (272.4) (E78.5) Continue statin therapy. Labs to be drawn by PCP later this week. Hypertension (401.9) (I10) Continue present med rx for BP, including coreg and losartan/HCTZ at present doses (he     prefers to split dose the losartan). Continue with home BP monitoring (has been under     reasonable control). Shortness of breath (786.05) (R06.02) Chronic CAMACHO; stable. Echo in past showed normal valve and cardiac function. No     significant CAD. Weight loss recommended.  Since last visit, doing well. No chest pains. Chronic CAMACHO. BP at home ~140/95 mmHg. No headaches. Weight stable. Taking Monjaro for weight control. No hospitalizations. Uses hand bicycle for about 10 minutes per day. Walks to [place] twice a week; otherwise, sedentary. Last labs 5 months ago by PCP; told everything was ok.   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

## 2024-07-24 LAB
ALBUMIN SERPL ELPH-MCNC: 4.1 G/DL
ALBUMIN SERPL ELPH-MCNC: 4.4 G/DL
ALP BLD-CCNC: 44 U/L
ALP BLD-CCNC: 47 U/L
ALT SERPL-CCNC: 14 U/L
ALT SERPL-CCNC: 16 U/L
ANION GAP SERPL CALC-SCNC: 13 MMOL/L
ANION GAP SERPL CALC-SCNC: 17 MMOL/L
AST SERPL-CCNC: 12 U/L
AST SERPL-CCNC: 27 U/L
BILIRUB SERPL-MCNC: 0.2 MG/DL
BILIRUB SERPL-MCNC: 0.4 MG/DL
BUN SERPL-MCNC: 15 MG/DL
BUN SERPL-MCNC: 17 MG/DL
CALCIUM SERPL-MCNC: 9.3 MG/DL
CALCIUM SERPL-MCNC: 9.6 MG/DL
CHLORIDE SERPL-SCNC: 101 MMOL/L
CHLORIDE SERPL-SCNC: 98 MMOL/L
CHOLEST SERPL-MCNC: 245 MG/DL
CO2 SERPL-SCNC: 24 MMOL/L
CO2 SERPL-SCNC: 27 MMOL/L
CREAT SERPL-MCNC: 0.84 MG/DL
CREAT SERPL-MCNC: 0.91 MG/DL
EGFR: 87 ML/MIN/1.73M2
EGFR: 90 ML/MIN/1.73M2
ESTIMATED AVERAGE GLUCOSE: 160 MG/DL
GLUCOSE SERPL-MCNC: 148 MG/DL
GLUCOSE SERPL-MCNC: 88 MG/DL
HBA1C MFR BLD HPLC: 7.2 %
HCT VFR BLD CALC: 48.2 %
HDLC SERPL-MCNC: 60 MG/DL
HGB BLD-MCNC: 15.4 G/DL
LDLC SERPL CALC-MCNC: 142 MG/DL
MCHC RBC-ENTMCNC: 27.2 PG
MCHC RBC-ENTMCNC: 32 GM/DL
MCV RBC AUTO: 85 FL
NONHDLC SERPL-MCNC: 184 MG/DL
PLATELET # BLD AUTO: 256 K/UL
POTASSIUM SERPL-SCNC: 4.7 MMOL/L
POTASSIUM SERPL-SCNC: 6.3 MMOL/L
PROT SERPL-MCNC: 6.5 G/DL
PROT SERPL-MCNC: 7.1 G/DL
RBC # BLD: 5.67 M/UL
RBC # FLD: 13.5 %
SODIUM SERPL-SCNC: 139 MMOL/L
SODIUM SERPL-SCNC: 140 MMOL/L
TRIGL SERPL-MCNC: 236 MG/DL
TSH SERPL-ACNC: 3.42 UIU/ML
WBC # FLD AUTO: 9.16 K/UL

## 2024-08-19 ENCOUNTER — NON-APPOINTMENT (OUTPATIENT)
Age: 79
End: 2024-08-19

## 2024-08-19 ENCOUNTER — APPOINTMENT (OUTPATIENT)
Dept: CARDIOLOGY | Facility: CLINIC | Age: 79
End: 2024-08-19
Payer: MEDICARE

## 2024-08-19 DIAGNOSIS — E78.5 HYPERLIPIDEMIA, UNSPECIFIED: ICD-10-CM

## 2024-08-19 DIAGNOSIS — R06.02 SHORTNESS OF BREATH: ICD-10-CM

## 2024-08-19 DIAGNOSIS — E11.9 TYPE 2 DIABETES MELLITUS W/OUT COMPLICATIONS: ICD-10-CM

## 2024-08-19 DIAGNOSIS — R07.9 CHEST PAIN, UNSPECIFIED: ICD-10-CM

## 2024-08-19 DIAGNOSIS — E66.9 OBESITY, UNSPECIFIED: ICD-10-CM

## 2024-08-19 DIAGNOSIS — I10 ESSENTIAL (PRIMARY) HYPERTENSION: ICD-10-CM

## 2024-08-19 PROCEDURE — G0404: CPT

## 2024-08-19 PROCEDURE — G2211 COMPLEX E/M VISIT ADD ON: CPT

## 2024-08-19 PROCEDURE — 99213 OFFICE O/P EST LOW 20 MIN: CPT

## 2024-08-19 NOTE — PHYSICAL EXAM
[Well Developed] : well developed [No Acute Distress] : no acute distress [Well Nourished] : well nourished [Obese] : obese [Normal Conjunctiva] : normal conjunctiva [Normal Venous Pressure] : normal venous pressure [No Carotid Bruit] : no carotid bruit [Normal S1, S2] : normal S1, S2 [No Murmur] : no murmur [No Rub] : no rub [No Gallop] : no gallop [Clear Lung Fields] : clear lung fields [Good Air Entry] : good air entry [No Respiratory Distress] : no respiratory distress  [Soft] : abdomen soft [Non Tender] : non-tender [No Masses/organomegaly] : no masses/organomegaly [Normal Bowel Sounds] : normal bowel sounds [Normal Gait] : normal gait [No Edema] : no edema [No Cyanosis] : no cyanosis [No Clubbing] : no clubbing [No Varicosities] : no varicosities [No Rash] : no rash [No Skin Lesions] : no skin lesions [Moves all extremities] : moves all extremities [No Focal Deficits] : no focal deficits [Normal Speech] : normal speech [Alert and Oriented] : alert and oriented [Normal memory] : normal memory [de-identified] : overweight

## 2024-08-19 NOTE — HISTORY OF PRESENT ILLNESS
[FreeTextEntry1] : 80 yo man presents for followup of shortness of breath / chest pain. Son is Harris (tel: [phone]); daughter is Marjorie (tel: [phone]). Presents today with son.  H/o DM (on oral medications; no known complications of DM), HLD, HTN (long-standing), obesity; former smoker, stopped many years ago. No h/o known heart disease, MI, heart failure, PVD, stroke. No bleeding issues. He had COVID.  CV data: Echo: minimal MR, mild aortic root dilation, calcified aortic valve with normal opening, normal LA, grossly normal LVEF, no peric effusion Stress test: Regadenoson; no chest pain or EKG changes; scan shows a small, mild defect in the mid to distal inferolateral wall (fixed) c/w infarct; LVEF 60% with no segmental wall motion abnormality CXR ([facility]) in Oct 2020: no pneumonia; bibasilar atelectasis/scarring; small hiatal hernia  In early October 2022, he was admitted to the hospital with chest pain. No MI detected. Cardiac catheterization showed only mild atherosclerosis (max stenosis was 40% ostial RCA); no obstructive CAD.  At his last visit in July 2024, the following issues were discussed: Chest pain (786.50) (R07.9) H/o multiple CV risk factors and an abnormal stress test in the past. Cardiac cath (2022)     showed mild atherosclerosis; no PCI . No further chest pain. For now, continue with     preventive therapy, including diet, exercise, weight management, meds. No further CV     testing indicated at this time. Diabetes (250.00) (E11.9) Continue with med rx and diet to address his DM. He understands the need for     improvement in his diet with an eye towards weight loss. Low dose aspirin to be     continued given (mild) CAD. Hyperlipemia (272.4) (E78.5) Continue statin therapy. Labs to be drawn today. Hypertension (401.9) (I10) BP at home and in office not at goal. Will increase Coreg dose to 12.5 mg BID today     (from 6.25 mg BID). Continue other medications as well. Reinforced diet, low salt,     exercise, and home monitoring. Shortness of breath (786.05) (R06.02) Chronic CAMACHO; stable. Echo in past showed normal valve and cardiac function. No     significant CAD. Weight loss recommended, as well as improved exercise regimen. Obesity without serious comorbidity, unspecified classification, unspecified obesity type (278.00) (E66.9) Certainly needs to lose weight to address his ongoing risk factors. He is on Monjaro.  Labs July 2024: CMP normal, creat 0.91 CBC normal A1c 7.2% tchol 245, trig 236, HDL 60,  mg/dL TSH 3.42  Spoke with son, Harris, multiple times between 7/23 and 7/24/2024 re: labs and medications. 1. initial labs showed hyperkalemia 2. f/u labs on 7/23/2024 showed normal K of 4.7 3. labs show marked elevation in lipids 4. Harris revealed that his father is not taking his medications regularly; stressed med compliance for all meds 5. Harris suggested that perhaps the Crestor is giving him some "aches". for now, i suggested adding CoQ10. if symptoms persist, can switch to atorva, or in future, [id number]  Since last visit, doing ok. Seems to be more compliant with medications as of late. He is not taking the statin every other day (previously, was not taking it at all). He would rather not take the coQ10; that's fine with me. Some CAMACHO, with physical activity. No edema. No heart failure or angina. BP at home seems stable. He takes his anti-HTN medications either once or twice a day, depending on the BP.   Since last visit, Sinus Rhythm  First degree A-V block  Nonspecific QRS widening and late transition; left anterior fascicular block ABNORMAL EKG

## 2024-08-19 NOTE — REVIEW OF SYSTEMS
[Dyspnea on exertion] : dyspnea during exertion [Chest Discomfort] : no chest discomfort [Lower Ext Edema] : no extremity edema [Orthopnea] : no orthopnea [PND] : no PND [Syncope] : no syncope [Negative] : Heme/Lymph

## 2024-08-19 NOTE — HISTORY OF PRESENT ILLNESS
[FreeTextEntry1] : 78 yo man presents for followup of shortness of breath / chest pain. Son is Harris (tel: [phone]); daughter is Marjorie (tel: [phone]). Presents today with son.  H/o DM (on oral medications; no known complications of DM), HLD, HTN (long-standing), obesity; former smoker, stopped many years ago. No h/o known heart disease, MI, heart failure, PVD, stroke. No bleeding issues. He had COVID.  CV data: Echo: minimal MR, mild aortic root dilation, calcified aortic valve with normal opening, normal LA, grossly normal LVEF, no peric effusion Stress test: Regadenoson; no chest pain or EKG changes; scan shows a small, mild defect in the mid to distal inferolateral wall (fixed) c/w infarct; LVEF 60% with no segmental wall motion abnormality CXR ([facility]) in Oct 2020: no pneumonia; bibasilar atelectasis/scarring; small hiatal hernia  In early October 2022, he was admitted to the hospital with chest pain. No MI detected. Cardiac catheterization showed only mild atherosclerosis (max stenosis was 40% ostial RCA); no obstructive CAD.  At his last visit in July 2024, the following issues were discussed: Chest pain (786.50) (R07.9) H/o multiple CV risk factors and an abnormal stress test in the past. Cardiac cath (2022)     showed mild atherosclerosis; no PCI . No further chest pain. For now, continue with     preventive therapy, including diet, exercise, weight management, meds. No further CV     testing indicated at this time. Diabetes (250.00) (E11.9) Continue with med rx and diet to address his DM. He understands the need for     improvement in his diet with an eye towards weight loss. Low dose aspirin to be     continued given (mild) CAD. Hyperlipemia (272.4) (E78.5) Continue statin therapy. Labs to be drawn today. Hypertension (401.9) (I10) BP at home and in office not at goal. Will increase Coreg dose to 12.5 mg BID today     (from 6.25 mg BID). Continue other medications as well. Reinforced diet, low salt,     exercise, and home monitoring. Shortness of breath (786.05) (R06.02) Chronic CAMACHO; stable. Echo in past showed normal valve and cardiac function. No     significant CAD. Weight loss recommended, as well as improved exercise regimen. Obesity without serious comorbidity, unspecified classification, unspecified obesity type (278.00) (E66.9) Certainly needs to lose weight to address his ongoing risk factors. He is on Monjaro.  Labs July 2024: CMP normal, creat 0.91 CBC normal A1c 7.2% tchol 245, trig 236, HDL 60,  mg/dL TSH 3.42  Spoke with son, Harris, multiple times between 7/23 and 7/24/2024 re: labs and medications. 1. initial labs showed hyperkalemia 2. f/u labs on 7/23/2024 showed normal K of 4.7 3. labs show marked elevation in lipids 4. Harris revealed that his father is not taking his medications regularly; stressed med compliance for all meds 5. Harris suggested that perhaps the Crestor is giving him some "aches". for now, i suggested adding CoQ10. if symptoms persist, can switch to atorva, or in future, [id number]  Since last visit, doing ok. Seems to be more compliant with medications as of late. He is not taking the statin every other day (previously, was not taking it at all). He would rather not take the coQ10; that's fine with me. Some CAMACHO, with physical activity. No edema. No heart failure or angina. BP at home seems stable. He takes his anti-HTN medications either once or twice a day, depending on the BP.   Since last visit, Sinus Rhythm  First degree A-V block  Nonspecific QRS widening and late transition; left anterior fascicular block ABNORMAL EKG

## 2024-08-19 NOTE — PHYSICAL EXAM
[Well Developed] : well developed [No Acute Distress] : no acute distress [Well Nourished] : well nourished [Obese] : obese [Normal Conjunctiva] : normal conjunctiva [Normal Venous Pressure] : normal venous pressure [No Carotid Bruit] : no carotid bruit [Normal S1, S2] : normal S1, S2 [No Murmur] : no murmur [No Rub] : no rub [No Gallop] : no gallop [Clear Lung Fields] : clear lung fields [Good Air Entry] : good air entry [No Respiratory Distress] : no respiratory distress  [Soft] : abdomen soft [Non Tender] : non-tender [No Masses/organomegaly] : no masses/organomegaly [Normal Bowel Sounds] : normal bowel sounds [Normal Gait] : normal gait [No Edema] : no edema [No Cyanosis] : no cyanosis [No Clubbing] : no clubbing [No Varicosities] : no varicosities [No Rash] : no rash [No Skin Lesions] : no skin lesions [Moves all extremities] : moves all extremities [No Focal Deficits] : no focal deficits [Normal Speech] : normal speech [Alert and Oriented] : alert and oriented [Normal memory] : normal memory [de-identified] : overweight

## 2024-11-18 ENCOUNTER — APPOINTMENT (OUTPATIENT)
Dept: CARDIOLOGY | Facility: CLINIC | Age: 79
End: 2024-11-18

## 2024-12-02 ENCOUNTER — APPOINTMENT (OUTPATIENT)
Dept: CARDIOLOGY | Facility: CLINIC | Age: 79
End: 2024-12-02

## 2024-12-02 DIAGNOSIS — E11.9 TYPE 2 DIABETES MELLITUS W/OUT COMPLICATIONS: ICD-10-CM

## 2024-12-02 DIAGNOSIS — E78.5 HYPERLIPIDEMIA, UNSPECIFIED: ICD-10-CM

## 2024-12-02 DIAGNOSIS — I10 ESSENTIAL (PRIMARY) HYPERTENSION: ICD-10-CM

## 2024-12-02 DIAGNOSIS — R07.9 CHEST PAIN, UNSPECIFIED: ICD-10-CM

## 2024-12-02 DIAGNOSIS — R06.02 SHORTNESS OF BREATH: ICD-10-CM

## 2025-05-08 NOTE — ED PROVIDER NOTE - PHYSICAL EXAMINATION
Consent: The patient's consent was obtained including but not limited to risks of crusting, scabbing, blistering, scarring, darker or lighter pigmentary change, recurrence, incomplete removal and infection. Duration Of Freeze Thaw-Cycle (Seconds): 0 Render In Bullet Format When Appropriate: No Post-Care Instructions: I reviewed with the patient in detail post-care instructions. Patient is to wear sunprotection, and avoid picking at any of the treated lesions. Pt may apply Vaseline to crusted or scabbing areas. Detail Level: Simple Total Number Of Aks Treated: 2 Gen: NAD, AOx3, able to make needs known, non-toxic  MSK: FROM left 3rd digit, sensation intact. +improving subungual hematoma without nail elevation  Neuro: Appears non focal  Skin: Warm, well perfused, no rash  Psych: normal affect